# Patient Record
Sex: FEMALE | Race: WHITE | NOT HISPANIC OR LATINO | Employment: OTHER | URBAN - NONMETROPOLITAN AREA
[De-identification: names, ages, dates, MRNs, and addresses within clinical notes are randomized per-mention and may not be internally consistent; named-entity substitution may affect disease eponyms.]

---

## 2018-07-16 ENCOUNTER — OFFICE VISIT CONVERTED (OUTPATIENT)
Dept: FAMILY MEDICINE CLINIC | Age: 48
End: 2018-07-16
Attending: NURSE PRACTITIONER

## 2018-08-28 ENCOUNTER — OFFICE VISIT CONVERTED (OUTPATIENT)
Dept: FAMILY MEDICINE CLINIC | Age: 48
End: 2018-08-28
Attending: FAMILY MEDICINE

## 2019-08-06 ENCOUNTER — OFFICE VISIT CONVERTED (OUTPATIENT)
Dept: FAMILY MEDICINE CLINIC | Age: 49
End: 2019-08-06
Attending: NURSE PRACTITIONER

## 2020-07-30 ENCOUNTER — HOSPITAL ENCOUNTER (OUTPATIENT)
Dept: OTHER | Facility: HOSPITAL | Age: 50
Discharge: HOME | End: 2020-07-30
Attending: NURSE PRACTITIONER

## 2020-07-30 ENCOUNTER — OFFICE VISIT CONVERTED (OUTPATIENT)
Dept: FAMILY MEDICINE CLINIC | Age: 50
End: 2020-07-30
Attending: NURSE PRACTITIONER

## 2020-07-30 LAB
ALBUMIN SERPL-MCNC: 4.1 G/DL (ref 3.5–5)
ALBUMIN/GLOB SERPL: 1.4 {RATIO} (ref 1.4–2.6)
ALP SERPL-CCNC: 88 U/L (ref 42–98)
ALT SERPL-CCNC: 26 U/L (ref 10–40)
ANION GAP SERPL CALC-SCNC: 21 MMOL/L (ref 8–19)
AST SERPL-CCNC: 19 U/L (ref 15–50)
BASOPHILS # BLD AUTO: 0.02 10*3/UL (ref 0–0.2)
BASOPHILS NFR BLD AUTO: 0.3 % (ref 0–3)
BILIRUB SERPL-MCNC: 0.32 MG/DL (ref 0.2–1.3)
BUN SERPL-MCNC: 20 MG/DL (ref 5–25)
BUN/CREAT SERPL: 24 {RATIO} (ref 6–20)
CALCIUM SERPL-MCNC: 10.5 MG/DL (ref 8.7–10.4)
CHLORIDE SERPL-SCNC: 104 MMOL/L (ref 99–111)
CONV ABS IMM GRAN: 0.02 10*3/UL (ref 0–0.2)
CONV CO2: 22 MMOL/L (ref 22–32)
CONV IMMATURE GRAN: 0.3 % (ref 0–1.8)
CONV TOTAL PROTEIN: 7 G/DL (ref 6.3–8.2)
CREAT UR-MCNC: 0.82 MG/DL (ref 0.5–0.9)
DEPRECATED RDW RBC AUTO: 46.7 FL (ref 36.4–46.3)
EOSINOPHIL # BLD AUTO: 0.09 10*3/UL (ref 0–0.7)
EOSINOPHIL # BLD AUTO: 1.5 % (ref 0–7)
ERYTHROCYTE [DISTWIDTH] IN BLOOD BY AUTOMATED COUNT: 13.3 % (ref 11.7–14.4)
GFR SERPLBLD BASED ON 1.73 SQ M-ARVRAT: >60 ML/MIN/{1.73_M2}
GLOBULIN UR ELPH-MCNC: 2.9 G/DL (ref 2–3.5)
GLUCOSE SERPL-MCNC: 113 MG/DL (ref 65–99)
HCT VFR BLD AUTO: 46 % (ref 37–47)
HGB BLD-MCNC: 14.3 G/DL (ref 12–16)
LYMPHOCYTES # BLD AUTO: 1.11 10*3/UL (ref 1–5)
LYMPHOCYTES NFR BLD AUTO: 18.5 % (ref 20–45)
MCH RBC QN AUTO: 29.7 PG (ref 27–31)
MCHC RBC AUTO-ENTMCNC: 31.1 G/DL (ref 33–37)
MCV RBC AUTO: 95.4 FL (ref 81–99)
MONOCYTES # BLD AUTO: 0.55 10*3/UL (ref 0.2–1.2)
MONOCYTES NFR BLD AUTO: 9.2 % (ref 3–10)
NEUTROPHILS # BLD AUTO: 4.22 10*3/UL (ref 2–8)
NEUTROPHILS NFR BLD AUTO: 70.2 % (ref 30–85)
NRBC CBCN: 0 % (ref 0–0.7)
OSMOLALITY SERPL CALC.SUM OF ELEC: 297 MOSM/KG (ref 273–304)
PLATELET # BLD AUTO: 222 10*3/UL (ref 130–400)
PMV BLD AUTO: 11.1 FL (ref 9.4–12.3)
POTASSIUM SERPL-SCNC: 4.5 MMOL/L (ref 3.5–5.3)
RBC # BLD AUTO: 4.82 10*6/UL (ref 4.2–5.4)
SODIUM SERPL-SCNC: 142 MMOL/L (ref 135–147)
T4 FREE SERPL-MCNC: 1.8 NG/DL (ref 0.9–1.8)
TSH SERPL-ACNC: 0.01 M[IU]/L (ref 0.27–4.2)
WBC # BLD AUTO: 6.01 10*3/UL (ref 4.8–10.8)

## 2020-08-12 ENCOUNTER — OFFICE VISIT (OUTPATIENT)
Dept: ORTHOPEDIC SURGERY | Facility: CLINIC | Age: 50
End: 2020-08-12

## 2020-08-12 VITALS — TEMPERATURE: 98.6 F | WEIGHT: 260 LBS | HEIGHT: 65 IN | BODY MASS INDEX: 43.32 KG/M2

## 2020-08-12 DIAGNOSIS — M75.01 ADHESIVE CAPSULITIS OF RIGHT SHOULDER: Primary | ICD-10-CM

## 2020-08-12 PROCEDURE — 99203 OFFICE O/P NEW LOW 30 MIN: CPT | Performed by: NURSE PRACTITIONER

## 2020-08-12 PROCEDURE — 73030 X-RAY EXAM OF SHOULDER: CPT | Performed by: NURSE PRACTITIONER

## 2020-08-12 PROCEDURE — 20610 DRAIN/INJ JOINT/BURSA W/O US: CPT | Performed by: NURSE PRACTITIONER

## 2020-08-12 RX ORDER — POTASSIUM CHLORIDE 1.5 G/1.77G
20 POWDER, FOR SOLUTION ORAL 2 TIMES DAILY
COMMUNITY

## 2020-08-12 RX ORDER — BUPROPION HYDROCHLORIDE 150 MG/1
150 TABLET ORAL DAILY
COMMUNITY
End: 2020-11-19

## 2020-08-12 RX ORDER — ASPIRIN 81 MG/1
81 TABLET ORAL EVERY MORNING
COMMUNITY

## 2020-08-12 RX ORDER — SPIRONOLACTONE 25 MG/1
12.5 TABLET ORAL NIGHTLY
COMMUNITY

## 2020-08-12 RX ORDER — FUROSEMIDE 40 MG/1
80 TABLET ORAL EVERY EVENING
COMMUNITY
End: 2021-10-04

## 2020-08-12 RX ORDER — METHYLPREDNISOLONE ACETATE 80 MG/ML
80 INJECTION, SUSPENSION INTRA-ARTICULAR; INTRALESIONAL; INTRAMUSCULAR; SOFT TISSUE
Status: COMPLETED | OUTPATIENT
Start: 2020-08-12 | End: 2020-08-12

## 2020-08-12 RX ORDER — METOPROLOL TARTRATE 50 MG/1
50 TABLET, FILM COATED ORAL 2 TIMES DAILY
COMMUNITY
End: 2021-02-17

## 2020-08-12 RX ADMIN — METHYLPREDNISOLONE ACETATE 80 MG: 80 INJECTION, SUSPENSION INTRA-ARTICULAR; INTRALESIONAL; INTRAMUSCULAR; SOFT TISSUE at 14:43

## 2020-08-12 NOTE — PROGRESS NOTES
Patient: Ashely Moore    YOB: 1970    Medical Record Number: [id number]    Chief Complaints:    Right shoulder pain and motion loss    History of Present Illness:     49 y.o. female patient who presents for evaluation of the right shoulder.  She reports that the symptoms first started 3 weeks ago.  Denies injury or precipitating event.  Pain is described as severe, intermittent, aching, burning, and stabbing.  The pain is worse with any movements especially certain reaching and lifting movements.  Patient reports little to no pain at rest.  She has tried heat, Tylenol, and Biofreeze without relief.  She has noticed significant motion loss.  Patient is right hand dominant.     Allergies: No Known Allergies    Home Medications:    Current Outpatient Medications:   •  aspirin 325 MG tablet, Take 325 mg by mouth Daily., Disp: , Rfl:   •  buPROPion XL (WELLBUTRIN XL) 150 MG 24 hr tablet, Take 150 mg by mouth Daily., Disp: , Rfl:   •  furosemide (LASIX) 40 MG tablet, Take 40 mg by mouth 2 (Two) Times a Day., Disp: , Rfl:   •  metoprolol tartrate (LOPRESSOR) 50 MG tablet, Take 50 mg by mouth 2 (Two) Times a Day., Disp: , Rfl:   •  potassium chloride (KLOR-CON) 20 MEQ packet, Take 20 mEq by mouth 2 (Two) Times a Day., Disp: , Rfl:   •  sacubitril-valsartan (Entresto) 49-51 MG tablet, Take 1 tablet by mouth 2 (Two) Times a Day., Disp: , Rfl:   •  spironolactone (ALDACTONE) 25 MG tablet, Take 25 mg by mouth Daily., Disp: , Rfl:     Past Medical History:   Diagnosis Date   • Anxiety    • CHF (congestive heart failure) (CMS/HCC)    • Depression with anxiety    • Nonischemic dilated cardiomyopathy (CMS/HCC)    • Sleep apnea        Past Surgical History:   Procedure Laterality Date   • CARDIAC DEFIBRILLATOR PLACEMENT  12/09/2010    New battery placed 09/13/2018    • WISDOM TOOTH EXTRACTION         Social History     Occupational History   • Not on file   Tobacco Use   • Smoking status: Never Smoker   •  "Smokeless tobacco: Never Used   Substance and Sexual Activity   • Alcohol use: Not Currently   • Drug use: Not on file   • Sexual activity: Not on file      Social History     Social History Narrative   • Not on file       Family History   Problem Relation Age of Onset   • Heart disease Father    • Heart disease Brother    • Heart disease Paternal Grandmother    • Heart disease Brother      Review of Systems:      Constitutional: Denies fever, shaking or chills   Eyes: Denies change in visual acuity   HEENT: Denies nasal congestion or sore throat   Respiratory: Denies cough or shortness of breath   Cardiovascular: Denies chest pain or edema  Endocrine: Denies tremors, palpitations, intolerance of heat or cold, polyuria, polydipsia.  GI: Denies abdominal pain, nausea, vomiting, bloody stools or diarrhea  : Denies frequency, urgency, incontinence, retention, or nocturia.  Musculoskeletal: Denies numbness, tingling or loss of motor function except as above  Integument: Denies rash, lesion or ulceration   Neurologic: Denies headache or focal weakness, deficits  Heme: Denies spontaneous or excessive bleeding, epistaxis, hematuria, melena, fatigue, enlarged or tender lymph nodes.      All other pertinent positives and negatives as noted above in HPI.    Physical Exam:   49 y.o. female  Vitals:    08/12/20 1403   Temp: 98.6 °F (37 °C)   TempSrc: Oral   Weight: 118 kg (260 lb)   Height: 165.1 cm (65\")     General:  Patient is awake and alert.  Appears in no acute distress or discomfort.    Psych:  Affect and demeanor are appropriate.    Eyes:  Conjunctiva and sclera appear grossly normal.  Eyes track well and EOM seem to be intact.    Ears:  No gross abnormalities.  Hearing adequate for the exam.    Cardiovascular:  Regular rate and rhythm.    Lungs:  Good chest expansion.  Breathing unlabored.    Lymph:  No palpable adenopathy about neck or axilla.    Neck:  Supple.  Normal ROM.  Negative Spurling's for shoulder or arm " pain.    Right upper extremity:  Skin benign and intact without evidence for swelling, masses or atrophy.  No palpable masses.  Moderate tenderness noted over the rotator interval.  Limited active and passive range of motion:  forward elevation 145°, external rotation 45°, horizontal abduction 115°, internal rotation to back pocket.  Passively, I can range her approximately 5° more.  No evident instability or apprehension.  Discomfort but well preserved strength with resistive testing of rotator cuff.  Good strength in the wrist and hand including flexion, extension, , pinch, finger and thumb abduction.  Intact sensation throughout the arm and hand.  Palpable radial pulse with brisk cap refill.         Radiology:   Dr. Koenig and I reviewed the x-rays together.  AP, scapular Y, and axillary views of the right shoulder are ordered by myself and reviewed to evaluate the patient's complaint.  No comparison films are immediately available.  The x-rays show no obvious acute abnormalities, lesions, masses, significant degenerative changes, or other concerning findings.  The acromiohumeral interval is normal.  Unable to assess glenoid version.    Assessment/Plan:   Right shoulder adhesive capsulitis    We discussed the natural history of this condition in detail.  We thoroughly discussed treatment options including conservative versus surgical options. I have recommended that we start with a conservative approach. With regards to conservative options, we discussed appropriate activity modifications, icing as needed, acetaminophen, physical therapy, and injections.  We thoroughly discussed the critical role of physical therapy in the treatment of this condition.  I demonstrated some home exercises for the patient.  She has opted for an injection and physical therapy.  The risks and alternatives to an injection were thoroughly discussed.  Patient consented.  The injection was performed as noted below.  I have entered a  referral for physical therapy as well.  Going forward, patient will follow-up with me in 8 weeks.  If the symptoms persist despite physical therapy, we may need to consider a manipulation.    APRYL Aguilera    08/12/2020    CC to Sergio Ribeiro MD    Large Joint Arthrocentesis: R subacromial bursa  Date/Time: 8/12/2020 2:43 PM  Consent given by: patient  Site marked: site marked  Timeout: Immediately prior to procedure a time out was called to verify the correct patient, procedure, equipment, support staff and site/side marked as required   Supporting Documentation  Indications: pain   Procedure Details  Location: shoulder - R subacromial bursa  Preparation: Patient was prepped and draped in the usual sterile fashion  Needle gauge: 21 G.  Approach: posterior  Medications administered: 2 mL lidocaine (cardiac); 80 mg methylPREDNISolone acetate 80 MG/ML  Patient tolerance: patient tolerated the procedure well with no immediate complications

## 2020-08-31 ENCOUNTER — TELEPHONE (OUTPATIENT)
Dept: ORTHOPEDIC SURGERY | Facility: CLINIC | Age: 50
End: 2020-08-31

## 2020-08-31 DIAGNOSIS — M75.01 ADHESIVE CAPSULITIS OF RIGHT SHOULDER: Primary | ICD-10-CM

## 2020-08-31 RX ORDER — MELOXICAM 15 MG/1
15 TABLET ORAL DAILY
Qty: 30 TABLET | Refills: 1 | Status: SHIPPED | OUTPATIENT
Start: 2020-08-31 | End: 2020-11-19

## 2020-08-31 NOTE — TELEPHONE ENCOUNTER
I spoke to Ms. Oscar.  We discussed options for pain relief in detail.  I have given her a prescription for meloxicam to take once daily as needed.  The risks of this medication were discussed.  She reports she was on this medication briefly 2 years ago and tolerated it well.

## 2020-10-07 ENCOUNTER — OFFICE VISIT (OUTPATIENT)
Dept: ORTHOPEDIC SURGERY | Facility: CLINIC | Age: 50
End: 2020-10-07

## 2020-10-07 VITALS — WEIGHT: 240 LBS | HEIGHT: 65 IN | BODY MASS INDEX: 39.99 KG/M2

## 2020-10-07 DIAGNOSIS — M75.01 ADHESIVE CAPSULITIS OF RIGHT SHOULDER: Primary | ICD-10-CM

## 2020-10-07 PROCEDURE — 99212 OFFICE O/P EST SF 10 MIN: CPT | Performed by: NURSE PRACTITIONER

## 2020-10-07 NOTE — PROGRESS NOTES
"Chief Complaint: Follow-up right shoulder pain and motion loss    HPI: Patient returns today for right shoulder follow-up.  Reports the injection did not help.  She has persistent pain ranging from moderate to severe with activity and range of motion.  Reports she has not made any progress with physical therapy and home exercises.  In fact, she says, \"I feel like I am worse since I saw you\".  She is very concerned about her condition and lack of progress.        Vitals:    10/07/20 1259   Weight: 109 kg (240 lb)   Height: 165.1 cm (65\")       Exam: Her right shoulder is examined briefly.  She continues to have limited active and passive range of motion.  130° forward elevation, 45° external rotation, internal rotation to side pocket.  Passively I can get her to roughly 150° of forward elevation and roughly 5 degrees more with external and internal rotation.  She appears to have a positive Empty can and Bear Hugger, but her exam is very guarded.  It is difficult to assess her cuff strength.  Internal and external strength is 5/5 with elbows at her side.  Good motor and sensory function in the lower arm and hand.      Imaging: None taken    Assessment: Right shoulder adhesive capsulitis, possible rotator cuff tear    Plan:   We discussed a shoulder manipulation and all that entails.  We briefly discussed the natural progression of rotator cuff tears.  She is been unable to progress her motion with physical therapy.  She appears to have adhesive capsulitis, but I am concerned about the possibility of a rotator cuff tear as well.  I recommended further evaluation by CT arthrogram of the right shoulder with an eye toward surgery.  She has an implanted defibrillator that is not MRI compatible.  Patient agreed to the study.  I will call her with the results and come up with a plan at that time.           Alicia Zimmerman, APRYL  10/07/2020    "

## 2020-10-13 ENCOUNTER — TELEPHONE (OUTPATIENT)
Dept: ORTHOPEDICS | Facility: OTHER | Age: 50
End: 2020-10-13

## 2020-10-13 NOTE — TELEPHONE ENCOUNTER
PATIENT CALLING IN REGARDS TO IMAGING ORDERS FOR MRI AND CT 10/9/2020.  SHE WAS ASKED TO CALL BACK IF SHE HAS NOT YET HEARD FROM SCHEDULING.      PLEASE ADVISE PATIENT.  [phone]

## 2020-10-13 NOTE — TELEPHONE ENCOUNTER
Called patient and gave her the number to scheduling at [facility] so she can call them and get her imaging scheduled.

## 2020-10-27 ENCOUNTER — HOSPITAL ENCOUNTER (OUTPATIENT)
Dept: CT IMAGING | Facility: HOSPITAL | Age: 50
Discharge: HOME | End: 2020-10-27

## 2020-10-27 ENCOUNTER — HOSPITAL ENCOUNTER (OUTPATIENT)
Dept: GENERAL RADIOLOGY | Facility: HOSPITAL | Age: 50
Discharge: HOME | End: 2020-10-27

## 2020-10-27 DIAGNOSIS — M75.01 ADHESIVE CAPSULITIS OF RIGHT SHOULDER: ICD-10-CM

## 2020-10-27 PROCEDURE — 77002 NEEDLE LOCALIZATION BY XRAY: CPT

## 2020-10-27 PROCEDURE — 25010000003 LIDOCAINE 1 % SOLUTION: Performed by: NURSE PRACTITIONER

## 2020-10-27 PROCEDURE — 73201 CT UPPER EXTREMITY W/DYE: CPT

## 2020-10-27 PROCEDURE — 25010000002 IOPAMIDOL 61 % SOLUTION: Performed by: NURSE PRACTITIONER

## 2020-10-27 RX ORDER — LIDOCAINE HYDROCHLORIDE 10 MG/ML
10 INJECTION, SOLUTION INFILTRATION; PERINEURAL ONCE
Status: COMPLETED | OUTPATIENT
Start: 2020-10-27 | End: 2020-10-27

## 2020-10-27 RX ADMIN — LIDOCAINE HYDROCHLORIDE 1 ML: 10 INJECTION, SOLUTION INFILTRATION; PERINEURAL at 10:49

## 2020-10-27 RX ADMIN — IOPAMIDOL 12 ML: 612 INJECTION, SOLUTION INTRAVENOUS at 10:49

## 2020-10-30 ENCOUNTER — TELEPHONE (OUTPATIENT)
Dept: ORTHOPEDIC SURGERY | Facility: CLINIC | Age: 50
End: 2020-10-30

## 2020-10-30 NOTE — TELEPHONE ENCOUNTER
I spoke to Ms. Moore.  I provided her with the right shoulder CT results.  She has moderate AC arthritis.  No rotator cuff tear or other internal derangement.  I explained that I feel her problem is still adhesive capsulitis.  I recommended she follow-up with Dr. Koenig for further evaluation and treatment.  She agreed.  I will have our office contact her to schedule this appointment.

## 2020-11-11 ENCOUNTER — TELEPHONE (OUTPATIENT)
Dept: ORTHOPEDICS | Facility: OTHER | Age: 50
End: 2020-11-11

## 2020-11-11 ENCOUNTER — OFFICE VISIT (OUTPATIENT)
Dept: ORTHOPEDIC SURGERY | Facility: CLINIC | Age: 50
End: 2020-11-11

## 2020-11-11 VITALS — WEIGHT: 260 LBS | TEMPERATURE: 96.2 F | HEIGHT: 65 IN | BODY MASS INDEX: 43.32 KG/M2

## 2020-11-11 DIAGNOSIS — M75.01 ADHESIVE CAPSULITIS OF RIGHT SHOULDER: Primary | ICD-10-CM

## 2020-11-11 PROCEDURE — 99214 OFFICE O/P EST MOD 30 MIN: CPT | Performed by: ORTHOPAEDIC SURGERY

## 2020-11-11 RX ORDER — PROMETHAZINE HYDROCHLORIDE 12.5 MG/1
12.5 TABLET ORAL EVERY 6 HOURS PRN
Qty: 30 TABLET | Refills: 0 | Status: SHIPPED | OUTPATIENT
Start: 2020-11-11 | End: 2020-11-19

## 2020-11-11 RX ORDER — TRAMADOL HYDROCHLORIDE 50 MG/1
50 TABLET ORAL EVERY 4 HOURS PRN
Qty: 60 TABLET | Refills: 0 | Status: SHIPPED | OUTPATIENT
Start: 2020-11-11 | End: 2020-11-19

## 2020-11-11 NOTE — H&P (VIEW-ONLY)
Patient: Ashely Moore    YOB: 1970    Medical Record Number: [id number]    Chief Complaints: Right shoulder pain and motion loss    History of Present Illness:     49 y.o. female patient who presents for her right shoulder.  She has previously seen Alicia for this issue.  She reports a roughly 4-month history of right shoulder pain and motion loss.  She had an injection which did not help.  She has been going to therapy but she feels like she has plateaued.  Pain is moderate, intermittent and stabbing.  The pain is associated with certain reaching and lifting movements.    Allergies: No Known Allergies    Home Medications:    Current Outpatient Medications:   •  aspirin (aspirin) 81 MG EC tablet, Take 81 mg by mouth Daily., Disp: , Rfl:   •  buPROPion XL (WELLBUTRIN XL) 150 MG 24 hr tablet, Take 150 mg by mouth Daily., Disp: , Rfl:   •  furosemide (LASIX) 40 MG tablet, Take 40 mg by mouth Daily., Disp: , Rfl:   •  metoprolol tartrate (LOPRESSOR) 50 MG tablet, Take 50 mg by mouth 2 (Two) Times a Day., Disp: , Rfl:   •  potassium chloride (KLOR-CON) 20 MEQ packet, Take 20 mEq by mouth Daily., Disp: , Rfl:   •  sacubitril-valsartan (Entresto) 49-51 MG tablet, Take 1 tablet by mouth 2 (Two) Times a Day., Disp: , Rfl:   •  spironolactone (ALDACTONE) 25 MG tablet, Take 25 mg by mouth Daily. 1/2 (12.5 mg) tablet daily, Disp: , Rfl:   •  meloxicam (Mobic) 15 MG tablet, Take 1 tablet by mouth Daily., Disp: 30 tablet, Rfl: 1  •  sertraline (ZOLOFT) 50 MG tablet, , Disp: , Rfl:     Past Medical History:   Diagnosis Date   • Anxiety    • CHF (congestive heart failure) (CMS/HCC)    • Depression with anxiety    • Nonischemic dilated cardiomyopathy (CMS/HCC)    • Sleep apnea        Past Surgical History:   Procedure Laterality Date   • CARDIAC DEFIBRILLATOR PLACEMENT  12/09/2010    New battery placed 09/13/2018    • WISDOM TOOTH EXTRACTION         Social History     Occupational History   • Not on file   Tobacco  "Use   • Smoking status: Never Smoker   • Smokeless tobacco: Never Used   Substance and Sexual Activity   • Alcohol use: Not Currently   • Drug use: Not on file   • Sexual activity: Not on file      Social History     Social History Narrative   • Not on file       Family History   Problem Relation Age of Onset   • Heart disease Father    • Heart disease Brother    • Heart disease Paternal Grandmother    • Heart disease Brother        Review of Systems:      Constitutional: Denies fever, shaking or chills   Eyes: Denies change in visual acuity   HEENT: Denies nasal congestion or sore throat   Respiratory: Denies cough or shortness of breath   Cardiovascular: Denies chest pain or edema  Endocrine: Denies tremors, palpitations, intolerance of heat or cold, polyuria, polydipsia.  GI: Denies abdominal pain, nausea, vomiting, bloody stools or diarrhea  : Denies frequency, urgency, incontinence, retention, or nocturia.  Musculoskeletal: Denies numbness, tingling or loss of motor function except as above  Integument: Denies rash, lesion or ulceration   Neurologic: Denies headache or focal weakness, deficits  Heme: Denies spontaneous or excessive bleeding, epistaxis, hematuria, melena, fatigue, enlarged or tender lymph nodes.      All other pertinent positives and negatives as noted above in HPI.    Physical Exam: 49 y.o. female  Vitals:    11/11/20 1052   Temp: 96.2 °F (35.7 °C)   TempSrc: Temporal   Weight: 118 kg (260 lb)   Height: 165.1 cm (65\")     General:  Patient is awake and alert.  Appears in no acute distress or discomfort.    Psych:  Affect and demeanor are appropriate.    Eyes:  Conjunctiva and sclera appear grossly normal.  Eyes track well and EOM seem to be intact.    Ears:  No gross abnormalities.  Hearing adequate for the exam.    Cardiovascular:  Regular rate and rhythm.    Lungs:  Good chest expansion.  Breathing unlabored.    Extremities: Right shoulder is examined.  Skin is benign.  No focal areas of " tenderness.  Active and passive motion are extremely limited.  Forward elevation is about 125 to 130 degrees.  External rotation roughly 35 degrees.  Internal rotation to between her side and back pocket.  She lacks at least 15 degrees of horizontal abduction.  Deltoid fires on exam.  Intact axillary nerve sensation.  Good strength with resisted abduction, elevation, internal and external rotation albeit with discomfort.  Normal motor and sensory function in her lower arm and hand.  Palpable radial pulse.  Brisk capillary refill.    Imaging: Her previous AP, scapular Y and axillary views of the right shoulder from August are reviewed.  No abnormalities noted.  Recent CT arthrogram of the right shoulder is reviewed along with the associated report.  Incidental note of moderate acromioclavicular arthritis.  Rotator cuff, cartilage and other structures look fine.    Assessment/Plan: Right shoulder adhesive capsulitis    We discussed the natural history of this condition and her options.  Given her failure of conservative treatment thus far, I have recommended a manipulation under anesthesia with an intra-articular injection.  I explained all risks including fracture, tendon tear, bleeding, infection, hematoma, chronic pain, worsening of pain, chondrolysis, swelling, persistent loss of motion, weakness, stiffness, instability, DVT, pulmonary embolus, death, stroke, complex regional pain syndrome, and need for additional procedures.  Patient verbalized understanding, and was given the opportunity to ask and have all questions answered today.  No guarantees were given regarding results of the procedure.  I do recommend she get medical clearance from her cardiologist, Dr. Bullock.  She tells me she has difficulty sleeping due to the pain and has requested something to take at night.  I offered her tramadol.  She tells me that medicine works for her but can cause nausea occasionally.  I have agreed to give her a limited  prescription for Phenergan as well.  Risks were discussed.    Gildardo Koenig MD    11/11/2020

## 2020-11-11 NOTE — TELEPHONE ENCOUNTER
PT WAS SEEN BY  ON 11/11 AND THEY HAD DISCUSSED SURGERY BEING SCHED IN DECEMBER DUE TO THE LACK OF PT HOURS, PT CALLED STATING THAT IT TURNS OUT SHE DOES ALREADY HAVE ENOUGH PT HOURS TO HAVE THE SURGERY, PT WANTS TO KNOW IF THERE WAS ANYWAY THE SURGERY CAN BE SCHED BEFORE December. PLEASE ADVISE       BEST CALL BACK [phone]

## 2020-11-11 NOTE — PROGRESS NOTES
Patient: Ashely Moore    YOB: 1970    Medical Record Number: [id number]    Chief Complaints: Right shoulder pain and motion loss    History of Present Illness:     49 y.o. female patient who presents for her right shoulder.  She has previously seen Alicia for this issue.  She reports a roughly 4-month history of right shoulder pain and motion loss.  She had an injection which did not help.  She has been going to therapy but she feels like she has plateaued.  Pain is moderate, intermittent and stabbing.  The pain is associated with certain reaching and lifting movements.    Allergies: No Known Allergies    Home Medications:    Current Outpatient Medications:   •  aspirin (aspirin) 81 MG EC tablet, Take 81 mg by mouth Daily., Disp: , Rfl:   •  buPROPion XL (WELLBUTRIN XL) 150 MG 24 hr tablet, Take 150 mg by mouth Daily., Disp: , Rfl:   •  furosemide (LASIX) 40 MG tablet, Take 40 mg by mouth Daily., Disp: , Rfl:   •  metoprolol tartrate (LOPRESSOR) 50 MG tablet, Take 50 mg by mouth 2 (Two) Times a Day., Disp: , Rfl:   •  potassium chloride (KLOR-CON) 20 MEQ packet, Take 20 mEq by mouth Daily., Disp: , Rfl:   •  sacubitril-valsartan (Entresto) 49-51 MG tablet, Take 1 tablet by mouth 2 (Two) Times a Day., Disp: , Rfl:   •  spironolactone (ALDACTONE) 25 MG tablet, Take 25 mg by mouth Daily. 1/2 (12.5 mg) tablet daily, Disp: , Rfl:   •  meloxicam (Mobic) 15 MG tablet, Take 1 tablet by mouth Daily., Disp: 30 tablet, Rfl: 1  •  sertraline (ZOLOFT) 50 MG tablet, , Disp: , Rfl:     Past Medical History:   Diagnosis Date   • Anxiety    • CHF (congestive heart failure) (CMS/HCC)    • Depression with anxiety    • Nonischemic dilated cardiomyopathy (CMS/HCC)    • Sleep apnea        Past Surgical History:   Procedure Laterality Date   • CARDIAC DEFIBRILLATOR PLACEMENT  12/09/2010    New battery placed 09/13/2018    • WISDOM TOOTH EXTRACTION         Social History     Occupational History   • Not on file   Tobacco  "Use   • Smoking status: Never Smoker   • Smokeless tobacco: Never Used   Substance and Sexual Activity   • Alcohol use: Not Currently   • Drug use: Not on file   • Sexual activity: Not on file      Social History     Social History Narrative   • Not on file       Family History   Problem Relation Age of Onset   • Heart disease Father    • Heart disease Brother    • Heart disease Paternal Grandmother    • Heart disease Brother        Review of Systems:      Constitutional: Denies fever, shaking or chills   Eyes: Denies change in visual acuity   HEENT: Denies nasal congestion or sore throat   Respiratory: Denies cough or shortness of breath   Cardiovascular: Denies chest pain or edema  Endocrine: Denies tremors, palpitations, intolerance of heat or cold, polyuria, polydipsia.  GI: Denies abdominal pain, nausea, vomiting, bloody stools or diarrhea  : Denies frequency, urgency, incontinence, retention, or nocturia.  Musculoskeletal: Denies numbness, tingling or loss of motor function except as above  Integument: Denies rash, lesion or ulceration   Neurologic: Denies headache or focal weakness, deficits  Heme: Denies spontaneous or excessive bleeding, epistaxis, hematuria, melena, fatigue, enlarged or tender lymph nodes.      All other pertinent positives and negatives as noted above in HPI.    Physical Exam: 49 y.o. female  Vitals:    11/11/20 1052   Temp: 96.2 °F (35.7 °C)   TempSrc: Temporal   Weight: 118 kg (260 lb)   Height: 165.1 cm (65\")     General:  Patient is awake and alert.  Appears in no acute distress or discomfort.    Psych:  Affect and demeanor are appropriate.    Eyes:  Conjunctiva and sclera appear grossly normal.  Eyes track well and EOM seem to be intact.    Ears:  No gross abnormalities.  Hearing adequate for the exam.    Cardiovascular:  Regular rate and rhythm.    Lungs:  Good chest expansion.  Breathing unlabored.    Extremities: Right shoulder is examined.  Skin is benign.  No focal areas of " tenderness.  Active and passive motion are extremely limited.  Forward elevation is about 125 to 130 degrees.  External rotation roughly 35 degrees.  Internal rotation to between her side and back pocket.  She lacks at least 15 degrees of horizontal abduction.  Deltoid fires on exam.  Intact axillary nerve sensation.  Good strength with resisted abduction, elevation, internal and external rotation albeit with discomfort.  Normal motor and sensory function in her lower arm and hand.  Palpable radial pulse.  Brisk capillary refill.    Imaging: Her previous AP, scapular Y and axillary views of the right shoulder from August are reviewed.  No abnormalities noted.  Recent CT arthrogram of the right shoulder is reviewed along with the associated report.  Incidental note of moderate acromioclavicular arthritis.  Rotator cuff, cartilage and other structures look fine.    Assessment/Plan: Right shoulder adhesive capsulitis    We discussed the natural history of this condition and her options.  Given her failure of conservative treatment thus far, I have recommended a manipulation under anesthesia with an intra-articular injection.  I explained all risks including fracture, tendon tear, bleeding, infection, hematoma, chronic pain, worsening of pain, chondrolysis, swelling, persistent loss of motion, weakness, stiffness, instability, DVT, pulmonary embolus, death, stroke, complex regional pain syndrome, and need for additional procedures.  Patient verbalized understanding, and was given the opportunity to ask and have all questions answered today.  No guarantees were given regarding results of the procedure.  I do recommend she get medical clearance from her cardiologist, Dr. Bullock.  She tells me she has difficulty sleeping due to the pain and has requested something to take at night.  I offered her tramadol.  She tells me that medicine works for her but can cause nausea occasionally.  I have agreed to give her a limited  prescription for Phenergan as well.  Risks were discussed.    Gildardo Koenig MD    11/11/2020

## 2020-11-13 NOTE — TELEPHONE ENCOUNTER
She should be able to do 5 straight days after the 19th, correct?  Thanksgiving is not until the following week.

## 2020-11-16 ENCOUNTER — TELEPHONE (OUTPATIENT)
Dept: ORTHOPEDICS | Facility: OTHER | Age: 50
End: 2020-11-16

## 2020-11-16 PROBLEM — M75.01 ADHESIVE CAPSULITIS OF RIGHT SHOULDER: Status: ACTIVE | Noted: 2020-11-16

## 2020-11-16 NOTE — TELEPHONE ENCOUNTER
CALLED INSURANCE AND SPOKE WITH STACIE AT [facility].  THE TRAMADOL RX IS GOING UP FOR REVIEW THIS COULD TAKE UP TO 5 DAYS.  THE PA # [id number]  THEY WILL BE NEEDING OFFICE NOTES AND THESE WERE FAXED [phone]    I CALLED PATIENT AND EXPLAINED EVERYTHING TO HER. THAT NOW WE ARE WAITING ON THE INSURANCE COMPANY TO REVIEW.

## 2020-11-16 NOTE — TELEPHONE ENCOUNTER
PT CALLED STATING THAT  PRESCRIBED TRAMELDOL AND WHEN SHE PICKED THE PRESCRIPTION UP SHE HAD TO PAY FOR THE MEDICINE OUT OF POCKET DUE TO INSURANCE NOT COVERING IT, PT STATED THAT SHE CALLED THE INSURANCE COMPANY AND THEY SAID THE MEDICINE HAD TO BE PRE AUTHORIZED BY THE DOCTOR IN ORDER FOR THEM TO COVER THE COSTS. INSURANCE GAVE THE PT NUMBER ([id number]) FOR  TO PRE AUTHORIZE PRESCRIPTION TO REIMBURSE THE PT.  DOCTOR WOULD HAVE TO BACK DATE TO 11/12/20 WHEN THE PT PICKED THE PRESCRIPTION UP. PLEASE  ADVISE.      BEST CALL NUMBER FOR PT: [phone]

## 2020-11-17 ENCOUNTER — TRANSCRIBE ORDERS (OUTPATIENT)
Dept: PREADMISSION TESTING | Facility: HOSPITAL | Age: 50
End: 2020-11-17

## 2020-11-17 DIAGNOSIS — Z01.818 OTHER SPECIFIED PRE-OPERATIVE EXAMINATION: Primary | ICD-10-CM

## 2020-11-19 ENCOUNTER — APPOINTMENT (OUTPATIENT)
Dept: PREADMISSION TESTING | Facility: HOSPITAL | Age: 50
End: 2020-11-19

## 2020-11-19 VITALS
DIASTOLIC BLOOD PRESSURE: 64 MMHG | SYSTOLIC BLOOD PRESSURE: 108 MMHG | HEART RATE: 97 BPM | HEIGHT: 65 IN | TEMPERATURE: 97.6 F | OXYGEN SATURATION: 96 % | RESPIRATION RATE: 20 BRPM | BODY MASS INDEX: 45.82 KG/M2 | WEIGHT: 275 LBS

## 2020-11-19 LAB
ANION GAP SERPL CALCULATED.3IONS-SCNC: 12.2 MMOL/L (ref 5–15)
BUN SERPL-MCNC: 23 MG/DL (ref 6–20)
BUN/CREAT SERPL: 23.5 (ref 7–25)
CALCIUM SPEC-SCNC: 9.1 MG/DL (ref 8.6–10.5)
CHLORIDE SERPL-SCNC: 104 MMOL/L (ref 98–107)
CO2 SERPL-SCNC: 25.8 MMOL/L (ref 22–29)
CREAT SERPL-MCNC: 0.98 MG/DL (ref 0.57–1)
DEPRECATED RDW RBC AUTO: 56 FL (ref 37–54)
ERYTHROCYTE [DISTWIDTH] IN BLOOD BY AUTOMATED COUNT: 17 % (ref 12.3–15.4)
GFR SERPL CREATININE-BSD FRML MDRD: 60 ML/MIN/1.73
GLUCOSE SERPL-MCNC: 98 MG/DL (ref 65–99)
HCT VFR BLD AUTO: 42 % (ref 34–46.6)
HGB BLD-MCNC: 14.1 G/DL (ref 12–15.9)
MCH RBC QN AUTO: 31 PG (ref 26.6–33)
MCHC RBC AUTO-ENTMCNC: 33.6 G/DL (ref 31.5–35.7)
MCV RBC AUTO: 92.3 FL (ref 79–97)
PLATELET # BLD AUTO: 199 10*3/MM3 (ref 140–450)
PMV BLD AUTO: 10.1 FL (ref 6–12)
POTASSIUM SERPL-SCNC: 3.8 MMOL/L (ref 3.5–5.2)
QT INTERVAL: 423 MS
RBC # BLD AUTO: 4.55 10*6/MM3 (ref 3.77–5.28)
SODIUM SERPL-SCNC: 142 MMOL/L (ref 136–145)
WBC # BLD AUTO: 6.08 10*3/MM3 (ref 3.4–10.8)

## 2020-11-19 PROCEDURE — 85027 COMPLETE CBC AUTOMATED: CPT

## 2020-11-19 PROCEDURE — 93010 ELECTROCARDIOGRAM REPORT: CPT | Performed by: INTERNAL MEDICINE

## 2020-11-19 PROCEDURE — 93005 ELECTROCARDIOGRAM TRACING: CPT

## 2020-11-19 PROCEDURE — 80048 BASIC METABOLIC PNL TOTAL CA: CPT

## 2020-11-19 PROCEDURE — 36415 COLL VENOUS BLD VENIPUNCTURE: CPT

## 2020-11-19 RX ORDER — ACETAMINOPHEN 500 MG
500 TABLET ORAL EVERY 6 HOURS PRN
COMMUNITY
End: 2020-11-24 | Stop reason: HOSPADM

## 2020-11-19 NOTE — DISCHARGE INSTRUCTIONS
Arrive to hospital on your day of surgery at 545 AM TO THE OUTPATIENT [facility]    Take the following medications the morning of surgery:  METOPROLOL AND TYLENOL IF NEEDED      If you are on prescription narcotic pain medication to control your pain you may also take that medication the morning of surgery.    General Instructions:  • Do not eat solid food after midnight the night before surgery.  • You may drink clear liquids day of surgery but must stop at least one hour before your hospital arrival time.  • It is beneficial for you to have a clear drink that contains carbohydrates the day of surgery.  We suggest a 12 to 20 ounce bottle of Gatorade or Powerade for non-diabetic patients or a 12 to 20 ounce bottle of G2 or Powerade Zero for diabetic patients. (Pediatric patients, are not advised to drink a 12 to 20 ounce carbohydrate drink)    Clear liquids are liquids you can see through.  Nothing red in color.     Plain water                               Sports drinks  Sodas                                   Gelatin (Jell-O)  Fruit juices without pulp such as white grape juice and apple juice  Popsicles that contain no fruit or yogurt  Tea or coffee (no cream or milk added)  Gatorade / Powerade  G2 / Powerade Zero    • Infants may have breast milk up to four hours before surgery.  • Infants drinking formula may drink formula up to six hours before surgery.   • Patients who avoid smoking, chewing tobacco and alcohol for 4 weeks prior to surgery have a reduced risk of post-operative complications.  Quit smoking as many days before surgery as you can.  • Do not smoke, use chewing tobacco or drink alcohol the day of surgery.   • If applicable bring your C-PAP/ BI-PAP machine.  • Bring any papers given to you in the doctor’s office.  • Wear clean comfortable clothes.  • Do not wear contact lenses, false eyelashes or make-up.  Bring a case for your glasses.   • Bring crutches or walker if applicable.  • Remove all  piercings.  Leave jewelry and any other valuables at home.  • Hair extensions with metal clips must be removed prior to surgery.  • The Pre-Admission Testing nurse will instruct you to bring medications if unable to obtain an accurate list in Pre-Admission Testing.        If you were given a blood bank ID arm band remember to bring it with you the day of surgery.    Preventing a Surgical Site Infection:  • For 2 to 3 days before surgery, avoid shaving with a razor because the razor can irritate skin and make it easier to develop an infection.    • Any areas of open skin can increase the risk of a post-operative wound infection by allowing bacteria to enter and travel throughout the body.  Notify your surgeon if you have any skin wounds / rashes even if it is not near the expected surgical site.  The area will need assessed to determine if surgery should be delayed until it is healed.  • The night prior to surgery shower using a fresh bar of anti-bacterial soap (such as Dial) and clean washcloth.  Sleep in a clean bed with clean clothing.  Do not allow pets to sleep with you.  • Shower on the morning of surgery using a fresh bar of anti-bacterial soap (such as Dial) and clean washcloth.  Dry with a clean towel and dress in clean clothing.  • Ask your surgeon if you will be receiving antibiotics prior to surgery.  • Make sure you, your family, and all healthcare providers clean their hands with soap and water or an alcohol based hand  before caring for you or your wound.    Day of surgery:  Your arrival time is approximately two hours before your scheduled surgery time.  Upon arrival, a Pre-op nurse and Anesthesiologist will review your health history, obtain vital signs, and answer questions you may have.  The only belongings needed at this time will be a list of your home medications and if applicable your C-PAP/BI-PAP machine.  If you are staying overnight your family can leave the rest of your belongings  in the car and bring them to your room later.  A Pre-op nurse will start an IV and you may receive medication in preparation for surgery, including something to help you relax.  While you are in surgery your family should notify the waiting room  if they leave the waiting room area and provide a contact phone number.    Please be aware that surgery does come with discomfort.  We want to make every effort to control your discomfort so please discuss any uncontrolled symptoms with your nurse.   Your doctor will most likely have prescribed pain medications.      If you are going home after surgery you will receive individualized written care instructions before being discharged.  A responsible adult must drive you to and from the hospital on the day of your surgery and stay with you for 24 hours.    If you are staying overnight following surgery, you will be transported to your hospital room following the recovery period.  [facility] has all private rooms.    If you have any questions please call Pre-Admission Testing at [phone].  Deductibles and co-payments are collected on the day of service. Please be prepared to pay the required co-pay, deductible or deposit on the day of service as defined by your plan.    Patient Education for Self-Quarantine Process    Following your COVID testing, we strongly recommend that you do not leave your home after you have been tested for COVID except to get medical care. This includes not going to work, school or to public areas.  If this is not possible for you to do please limit your activities to only required outings.  Be sure to wear a mask when you are with other people, practice social distancing and wash your hands frequently.      The following items provide additional details to keep you safe.  • Wash your hands with soap and water frequently for at least 20 seconds.   • Avoid touching your eyes, nose and mouth with unwashed hands.  • Do not  share anything - utensils, towels, food from the same bowl.   • Have your own utensils, drinking glass, dishes, towels and bedding.   • Do not have visitors.   • Do use FaceTime to stay in touch with family and friends.  • You should stay in a specific room away from others if possible.   • Stay at least 6 feet away from others in the home if you cannot have a dedicated room to yourself.   • Do not snuggle with your pet. While the CDC says there is no evidence that pets can spread COVID-19 or be infected from humans, it is probably best to avoid “petting, snuggling, being kissed or licked and sharing food (during self-quarantine)”, according to the CDC.   • Sanitize household surfaces daily. Include all high touch areas (door handles, light switches, phones, countertops, etc.)  • Do not share a bathroom with others, if possible.   • Wear a mask around others in your home if you are unable to stay in a separate room or 6 feet apart. If  you are unable to wear a mask, have your family member wear a mask if they must be within 6 feet of you.   Call your surgeon immediately if you experience any of the following symptoms:  • Sore Throat  • Shortness of Breath or difficulty breathing  • Cough  • Chills  • Body soreness or muscle pain  • Headache  • Fever  • New loss of taste or smell  • Do not arrive for your surgery ill.  Your procedure will need to be rescheduled to another time.  You will need to call your physician before the day of surgery to avoid any unnecessary exposure to hospital staff as well as other patients.

## 2020-11-21 ENCOUNTER — LAB (OUTPATIENT)
Dept: LAB | Facility: HOSPITAL | Age: 50
End: 2020-11-21

## 2020-11-21 DIAGNOSIS — Z01.818 OTHER SPECIFIED PRE-OPERATIVE EXAMINATION: ICD-10-CM

## 2020-11-21 PROCEDURE — C9803 HOPD COVID-19 SPEC COLLECT: HCPCS

## 2020-11-21 PROCEDURE — U0004 COV-19 TEST NON-CDC HGH THRU: HCPCS

## 2020-11-23 LAB — SARS-COV-2 RNA RESP QL NAA+PROBE: NOT DETECTED

## 2020-11-24 ENCOUNTER — HOSPITAL ENCOUNTER (OUTPATIENT)
Facility: HOSPITAL | Age: 50
Setting detail: HOSPITAL OUTPATIENT SURGERY
Discharge: HOME | End: 2020-11-24
Attending: ORTHOPAEDIC SURGERY | Admitting: ORTHOPAEDIC SURGERY

## 2020-11-24 ENCOUNTER — ANESTHESIA (OUTPATIENT)
Dept: PERIOP | Facility: HOSPITAL | Age: 50
End: 2020-11-24

## 2020-11-24 ENCOUNTER — ANESTHESIA EVENT (OUTPATIENT)
Dept: PERIOP | Facility: HOSPITAL | Age: 50
End: 2020-11-24

## 2020-11-24 VITALS
SYSTOLIC BLOOD PRESSURE: 111 MMHG | OXYGEN SATURATION: 92 % | DIASTOLIC BLOOD PRESSURE: 74 MMHG | HEART RATE: 83 BPM | TEMPERATURE: 97.8 F | RESPIRATION RATE: 20 BRPM

## 2020-11-24 VITALS — OXYGEN SATURATION: 93 % | HEART RATE: 91 BPM | SYSTOLIC BLOOD PRESSURE: 133 MMHG | DIASTOLIC BLOOD PRESSURE: 94 MMHG

## 2020-11-24 DIAGNOSIS — M75.01 ADHESIVE CAPSULITIS OF RIGHT SHOULDER: Primary | ICD-10-CM

## 2020-11-24 PROCEDURE — 25010000002 ROPIVACAINE PER 1 MG: Performed by: ANESTHESIOLOGY

## 2020-11-24 PROCEDURE — 25010000002 MIDAZOLAM PER 1 MG: Performed by: ANESTHESIOLOGY

## 2020-11-24 PROCEDURE — 25010000002 METHYLPREDNISOLONE PER 80 MG: Performed by: ORTHOPAEDIC SURGERY

## 2020-11-24 PROCEDURE — 25010000002 FENTANYL CITRATE (PF) 100 MCG/2ML SOLUTION: Performed by: ANESTHESIOLOGY

## 2020-11-24 PROCEDURE — 25010000002 DEXAMETHASONE PER 1 MG: Performed by: ANESTHESIOLOGY

## 2020-11-24 PROCEDURE — 23700 MNPJ ANES SHO JT FIXJ APRATS: CPT | Performed by: ORTHOPAEDIC SURGERY

## 2020-11-24 PROCEDURE — 25010000002 PROPOFOL 10 MG/ML EMULSION: Performed by: ANESTHESIOLOGY

## 2020-11-24 PROCEDURE — 94799 UNLISTED PULMONARY SVC/PX: CPT

## 2020-11-24 PROCEDURE — 25010000003 LIDOCAINE 1 % SOLUTION: Performed by: ORTHOPAEDIC SURGERY

## 2020-11-24 RX ORDER — FLUMAZENIL 0.1 MG/ML
0.2 INJECTION INTRAVENOUS AS NEEDED
Status: DISCONTINUED | OUTPATIENT
Start: 2020-11-24 | End: 2020-11-24 | Stop reason: HOSPADM

## 2020-11-24 RX ORDER — MIDAZOLAM HYDROCHLORIDE 1 MG/ML
1 INJECTION INTRAMUSCULAR; INTRAVENOUS ONCE
Status: COMPLETED | OUTPATIENT
Start: 2020-11-24 | End: 2020-11-24

## 2020-11-24 RX ORDER — FENTANYL CITRATE 50 UG/ML
100 INJECTION, SOLUTION INTRAMUSCULAR; INTRAVENOUS
Status: DISCONTINUED | OUTPATIENT
Start: 2020-11-24 | End: 2020-11-24 | Stop reason: HOSPADM

## 2020-11-24 RX ORDER — LIDOCAINE HYDROCHLORIDE 10 MG/ML
0.5 INJECTION, SOLUTION EPIDURAL; INFILTRATION; INTRACAUDAL; PERINEURAL ONCE AS NEEDED
Status: DISCONTINUED | OUTPATIENT
Start: 2020-11-24 | End: 2020-11-24 | Stop reason: HOSPADM

## 2020-11-24 RX ORDER — DOCUSATE SODIUM 100 MG/1
100 CAPSULE, LIQUID FILLED ORAL 2 TIMES DAILY
Qty: 60 CAPSULE | Refills: 0 | Status: SHIPPED | OUTPATIENT
Start: 2020-11-24 | End: 2021-02-17

## 2020-11-24 RX ORDER — DEXAMETHASONE SODIUM PHOSPHATE 4 MG/ML
INJECTION, SOLUTION INTRA-ARTICULAR; INTRALESIONAL; INTRAMUSCULAR; INTRAVENOUS; SOFT TISSUE
Status: COMPLETED | OUTPATIENT
Start: 2020-11-24 | End: 2020-11-24

## 2020-11-24 RX ORDER — MIDAZOLAM HYDROCHLORIDE 1 MG/ML
2 INJECTION INTRAMUSCULAR; INTRAVENOUS
Status: DISCONTINUED | OUTPATIENT
Start: 2020-11-24 | End: 2020-11-24 | Stop reason: HOSPADM

## 2020-11-24 RX ORDER — EPHEDRINE SULFATE 50 MG/ML
5 INJECTION, SOLUTION INTRAVENOUS ONCE AS NEEDED
Status: DISCONTINUED | OUTPATIENT
Start: 2020-11-24 | End: 2020-11-24 | Stop reason: HOSPADM

## 2020-11-24 RX ORDER — HYDROMORPHONE HYDROCHLORIDE 1 MG/ML
0.5 INJECTION, SOLUTION INTRAMUSCULAR; INTRAVENOUS; SUBCUTANEOUS
Status: DISCONTINUED | OUTPATIENT
Start: 2020-11-24 | End: 2020-11-24 | Stop reason: HOSPADM

## 2020-11-24 RX ORDER — HYDROCODONE BITARTRATE AND ACETAMINOPHEN 7.5; 325 MG/1; MG/1
1 TABLET ORAL ONCE AS NEEDED
Status: COMPLETED | OUTPATIENT
Start: 2020-11-24 | End: 2020-11-24

## 2020-11-24 RX ORDER — METHYLPREDNISOLONE ACETATE 80 MG/ML
INJECTION, SUSPENSION INTRA-ARTICULAR; INTRALESIONAL; INTRAMUSCULAR; SOFT TISSUE AS NEEDED
Status: DISCONTINUED | OUTPATIENT
Start: 2020-11-24 | End: 2020-11-24 | Stop reason: HOSPADM

## 2020-11-24 RX ORDER — SODIUM CHLORIDE 0.9 % (FLUSH) 0.9 %
3 SYRINGE (ML) INJECTION EVERY 12 HOURS SCHEDULED
Status: DISCONTINUED | OUTPATIENT
Start: 2020-11-24 | End: 2020-11-24 | Stop reason: HOSPADM

## 2020-11-24 RX ORDER — FENTANYL CITRATE 50 UG/ML
50 INJECTION, SOLUTION INTRAMUSCULAR; INTRAVENOUS
Status: DISCONTINUED | OUTPATIENT
Start: 2020-11-24 | End: 2020-11-24 | Stop reason: HOSPADM

## 2020-11-24 RX ORDER — SODIUM CHLORIDE, SODIUM LACTATE, POTASSIUM CHLORIDE, CALCIUM CHLORIDE 600; 310; 30; 20 MG/100ML; MG/100ML; MG/100ML; MG/100ML
9 INJECTION, SOLUTION INTRAVENOUS CONTINUOUS
Status: DISCONTINUED | OUTPATIENT
Start: 2020-11-24 | End: 2020-11-24 | Stop reason: HOSPADM

## 2020-11-24 RX ORDER — ROPIVACAINE HYDROCHLORIDE 2 MG/ML
INJECTION, SOLUTION EPIDURAL; INFILTRATION; PERINEURAL
Status: COMPLETED | OUTPATIENT
Start: 2020-11-24 | End: 2020-11-24

## 2020-11-24 RX ORDER — ROPIVACAINE HYDROCHLORIDE 5 MG/ML
INJECTION, SOLUTION EPIDURAL; INFILTRATION; PERINEURAL
Status: COMPLETED | OUTPATIENT
Start: 2020-11-24 | End: 2020-11-24

## 2020-11-24 RX ORDER — LIDOCAINE HYDROCHLORIDE 10 MG/ML
INJECTION, SOLUTION INFILTRATION; PERINEURAL AS NEEDED
Status: DISCONTINUED | OUTPATIENT
Start: 2020-11-24 | End: 2020-11-24 | Stop reason: HOSPADM

## 2020-11-24 RX ORDER — SODIUM CHLORIDE 0.9 % (FLUSH) 0.9 %
3-10 SYRINGE (ML) INJECTION AS NEEDED
Status: DISCONTINUED | OUTPATIENT
Start: 2020-11-24 | End: 2020-11-24 | Stop reason: HOSPADM

## 2020-11-24 RX ORDER — ONDANSETRON 4 MG/1
4 TABLET, FILM COATED ORAL EVERY 8 HOURS PRN
Qty: 30 TABLET | Refills: 0 | Status: SHIPPED | OUTPATIENT
Start: 2020-11-24 | End: 2021-02-17

## 2020-11-24 RX ORDER — OXYCODONE AND ACETAMINOPHEN 7.5; 325 MG/1; MG/1
1 TABLET ORAL ONCE AS NEEDED
Status: DISCONTINUED | OUTPATIENT
Start: 2020-11-24 | End: 2020-11-24 | Stop reason: HOSPADM

## 2020-11-24 RX ORDER — ONDANSETRON 2 MG/ML
4 INJECTION INTRAMUSCULAR; INTRAVENOUS ONCE AS NEEDED
Status: DISCONTINUED | OUTPATIENT
Start: 2020-11-24 | End: 2020-11-24 | Stop reason: HOSPADM

## 2020-11-24 RX ORDER — PROPOFOL 10 MG/ML
VIAL (ML) INTRAVENOUS AS NEEDED
Status: DISCONTINUED | OUTPATIENT
Start: 2020-11-24 | End: 2020-11-24 | Stop reason: SURG

## 2020-11-24 RX ORDER — HYDROCODONE BITARTRATE AND ACETAMINOPHEN 7.5; 325 MG/1; MG/1
1-2 TABLET ORAL EVERY 4 HOURS PRN
Qty: 42 TABLET | Refills: 0 | Status: SHIPPED | OUTPATIENT
Start: 2020-11-24 | End: 2021-04-26

## 2020-11-24 RX ADMIN — HYDROCODONE BITARTRATE AND ACETAMINOPHEN 1 TABLET: 7.5; 325 TABLET ORAL at 14:59

## 2020-11-24 RX ADMIN — SODIUM CHLORIDE, POTASSIUM CHLORIDE, SODIUM LACTATE AND CALCIUM CHLORIDE 9 ML/HR: 600; 310; 30; 20 INJECTION, SOLUTION INTRAVENOUS at 09:25

## 2020-11-24 RX ADMIN — PROPOFOL 20 MG: 10 INJECTION, EMULSION INTRAVENOUS at 10:29

## 2020-11-24 RX ADMIN — PROPOFOL 40 MG: 10 INJECTION, EMULSION INTRAVENOUS at 10:27

## 2020-11-24 RX ADMIN — ROPIVACAINE HYDROCHLORIDE 20 ML: 5 INJECTION, SOLUTION EPIDURAL; INFILTRATION; PERINEURAL at 10:04

## 2020-11-24 RX ADMIN — DEXAMETHASONE SODIUM PHOSPHATE 4 MG: 4 INJECTION INTRA-ARTICULAR; INTRALESIONAL; INTRAMUSCULAR; INTRAVENOUS; SOFT TISSUE at 10:04

## 2020-11-24 RX ADMIN — FENTANYL CITRATE 50 MCG: 50 INJECTION, SOLUTION INTRAMUSCULAR; INTRAVENOUS at 09:51

## 2020-11-24 RX ADMIN — MIDAZOLAM 1 MG: 1 INJECTION INTRAMUSCULAR; INTRAVENOUS at 09:51

## 2020-11-24 RX ADMIN — ROPIVACAINE HYDROCHLORIDE 10 ML: 2 INJECTION, SOLUTION EPIDURAL; INFILTRATION at 10:04

## 2020-11-24 RX ADMIN — MIDAZOLAM 1 MG: 1 INJECTION INTRAMUSCULAR; INTRAVENOUS at 11:11

## 2020-11-24 NOTE — NURSING NOTE
TALKED WITH DR KING PT VERY ANXIOUS C/O OF TROUB BREATHIN SINCE THE BLOCK DR DE LA CRUZ TALKED WITH MIKE

## 2020-11-24 NOTE — NURSING NOTE
PT remains very anxious stating I can't breath between gasping breaths. C/o of nausea vomitted a small amount Dr De La Cruz and Dr Newsome at bedside feels like it is safe to do manipulation. Dr Koenig arrived signed off

## 2020-11-24 NOTE — ANESTHESIA POSTPROCEDURE EVALUATION
Patient: Ashely Moore    Procedure Summary     Date: 11/24/20 Room / Location: [facility] PREOP MANIPS /  OSCAR OR O[facility]    Anesthesia Start: 1017 Anesthesia Stop: 1046    Procedure: SHOULDER MANIPULATION and injection (Right ) Diagnosis:       Adhesive capsulitis of right shoulder      (Adhesive capsulitis of right shoulder [M75.01])    Surgeon: Gildardo Koenig MD Provider: Raymond Newsome MD    Anesthesia Type: MAC ASA Status: 4          Anesthesia Type: MAC    Vitals  Vitals Value Taken Time   /79 11/24/20 1221   Temp     Pulse 80 11/24/20 1220   Resp 20 11/24/20 1220   SpO2 91 % 11/24/20 1222           Post Anesthesia Care and Evaluation    Patient location during evaluation: bedside  Patient participation: complete - patient participated  Level of consciousness: awake  Pain score: 1  Pain management: adequate  Airway patency: patent  Anesthetic complications: No anesthetic complications  PONV Status: controlled  Cardiovascular status: acceptable  Respiratory status: acceptable  Hydration status: acceptable    Comments: --------------------            11/24/20               1430     --------------------   BP:                  Pulse:      83       Resp:       20       Temp:                SpO2:      92%      --------------------      Had significant sensation of soa from the phrenic nerve involvement from her block  Much better now and ready to go home

## 2020-11-24 NOTE — NURSING NOTE
Pt says breathing easier now, no more grunting states not back to normal but feels like she wants to go home. Fan has helped continue to monitor.

## 2020-11-24 NOTE — ANESTHESIA PREPROCEDURE EVALUATION
Anesthesia Evaluation     NPO Solid Status: > 8 hours             Airway   Mallampati: III  TM distance: >3 FB  Neck ROM: full  Possible difficult intubation  Dental - normal exam     Pulmonary - normal exam   (+) sleep apnea,   Cardiovascular - normal exam    (+) pacemaker ICD, hypertension, CHF ,       Neuro/Psych  (+) psychiatric history Anxiety,     GI/Hepatic/Renal/Endo    (+) morbid obesity,      Musculoskeletal     Abdominal  - normal exam   Substance History      OB/GYN          Other        ROS/Med Hx Other: Nonischemic dilated cardiomyopathy    Tachy hx s/p ablation                  Anesthesia Plan    ASA 4     MAC   (Isb popc)  intravenous induction     Anesthetic plan, all risks, benefits, and alternatives have been provided, discussed and informed consent has been obtained with: patient.    Plan discussed with CRNA.

## 2020-11-24 NOTE — NURSING NOTE
"Pt continues to be very anxious stating \"I Can't breath\"  Dr Newsome and Dr De La Cruz at bedside rr 32 c/o stomach hurting sitting upright had some nausea and vomitting.  "

## 2020-11-24 NOTE — NURSING NOTE
Talked with Dr De La Cruz about continued difficulty to breath pulling 500-750 on IS decreased breath sound on right side, only able to maintian o2 sat 90 without o2. Will place on o2 and continue to monitor as the block continues to wear off.

## 2020-11-24 NOTE — ANESTHESIA PROCEDURE NOTES
Peripheral Block    Pre-sedation assessment completed: 11/24/2020 9:54 AM    Patient reassessed immediately prior to procedure    Patient location during procedure: pre-op  Start time: 11/24/2020 9:54 AM  Stop time: 11/24/2020 10:04 AM  Reason for block: at surgeon's request and post-op pain management  Performed by  Anesthesiologist: Jose Elias De La Cruz MD  Preanesthetic Checklist  Completed: patient identified, site marked, surgical consent, pre-op evaluation, timeout performed, IV checked, risks and benefits discussed and monitors and equipment checked  Prep:  Pt Position: supine  Sterile barriers:cap, gloves, mask and sterile barriers  Prep: ChloraPrep  Patient monitoring: blood pressure monitoring, continuous pulse oximetry and EKG  Procedure  Sedation:yes  Performed under: local infiltration  Guidance:ultrasound guided  ULTRASOUND INTERPRETATION.  Using ultrasound guidance a 22 G gauge needle was placed in close proximity to the brachial plexus nerve, at which point, under ultrasound guidance anesthetic was injected in the area of the nerve and spread of the anesthesia was seen on ultrasound in close proximity thereto.  There were no abnormalities seen on ultrasound; a digital image was taken; and the patient tolerated the procedure with no complications. Images:still images obtained    Laterality:right  Block Type:interscalene  Injection Technique:single-shot  Needle Type:echogenic  Needle Gauge:22 G  Resistance on Injection: less than 15 psi    Medications Used: dexamethasone (DECADRON) injection, 4 mg  ropivacaine (NAROPIN) 0.5 % injection, 20 mL  ropivacaine (NAROPIN) 0.2 % injection, 10 mL  Med admintered at 11/24/2020 10:04 AM      Post Assessment  Injection Assessment: negative aspiration for heme, no paresthesia on injection and incremental injection  Patient Tolerance:comfortable throughout block  Complications:no

## 2020-11-24 NOTE — NURSING NOTE
Pt wanting to go home trying to get to PT appt continues with SOB but pt states she feels ok to go home. Always a little short of breath Dr De La Cruz spoke with pt and ok to D/C instructions discussed with  over the phone.

## 2020-11-24 NOTE — OP NOTE
Orthopaedic Operative Note    Facility: [facility]    Patient: Ashely Moore    Medical Record Number: [id number]    YOB: 1970    Dictating Surgeon: Gildardo Koenig M.D.*    Primary Care Physician: Sergio Ribeiro MD    Date of Operation: 11/24/2020    Pre-Operative Diagnosis:  Right shoulder adhesive capsulitis    Post-Operative Diagnosis:  Right shoulder adhesive capsulitis     Procedure Performed:  Right shoulder manipulation under anesthesia and intra-articular corticosteroid injection    Surgeon: Gildardo Koenig MD     Assistant: None    Anesthesia: Regional followed by monitored anesthesia care    Complications: None.     Estimated Blood Loss: None    Implants: None    Specimens: * No orders in the log *    Brief Operative Indication:  The patient had a long history of recalcitrant right shoulder adhesive capsulitis which had been nonresponsive to conservative treatment.  The risks, benefits, and alternatives to a manipulation under anesthesia and intra-articular injection were carefully discussed with the patient.  Specifically, we discussed the risk of infection related to the injection, fracture from the manipulation, persistent or worsened pain after the procedure, persistent or worsened arthrofibrosis potentially necessitating further surgery.  We also discussed the risk of iatrogenic injury to nearby nerves and blood vessels, particularly the axillary and/or musculocutaneous nerves which could result in permanent weakness, numbness, and dysfunction.  Lastly, we talked about RSD, DVT, PE, and anesthesia related complications.  The patient acknowledged understanding of this information and consented to proceed.    Description of the procedure in detail:  The patient and operative site were identified in the preoperative holding area.  Adequate regional anesthesia of the right shoulder was administered.  This did compromise her phrenic nerve and she was feeling  short of breath.  We monitored her closely and her oxygen saturations remained 98 to 100%.  We made sure that she was comfortable and determined to proceed.  Following this, a timeout was taken in which the patient participated.  Adequate monitored anesthesia care was then administered.  An examination under anesthesia was then performed.  The patient demonstrated significant loss of motion in the shoulder compared to the contralateral side.  Over a period of several minutes, gradual pressure was applied to bring the arm up into maximal forward elevation.  I performed this gradually so as to hopefully minimize the risk of iatrogenic fracture.  I could both hear and feel the adhesions break up.  I was able to achieve full fluid forward elevation.  Next, the patient's arm was brought out into horizontal abduction followed by internal rotation.  Again, I could hear and feel the adhesions break up and was able to achieve full fluid passive motion.  Lastly, the patient's arm was brought into cross body adduction and then external rotation with the arm at the side.      Once the manipulation was completed, the anterior aspect of the shoulder was prepped with a ChloraPrep.  I allowed that to dry and then an intra-articular injection of 80 mg Depo-Medrol and approximately 4 cc of half percent bupivacaine with epinephrine was performed.  The patient tolerated the procedure well.  There were no complications.  A sterile dressing was applied to the site of the injection.  The arm was placed in a sling.  The patient was awakened in good condition.  We will monitor her post procedure to make sure that she is breathing better and comfortable before discharging her home.    Gildardo Koenig MD  11/24/20

## 2020-11-24 NOTE — BRIEF OP NOTE
MANIPULATION OF  Progress Note    Ashely Moore  11/24/2020    Pre-op Diagnosis:   Adhesive capsulitis of right shoulder [M75.01]       Post-Op Diagnosis Codes:     * Adhesive capsulitis of right shoulder [M75.01]    Procedure/CPT® Codes:        Procedure(s):  SHOULDER MANIPULATION and injection    Surgeon(s):  Gildardo Koenig MD    Anesthesia: Monitored Anesthesia Care with Regional    Staff:   Circulator: Savanah Perkins RN         Estimated Blood Loss: none    Urine Voided: * No values recorded between 11/24/2020 10:27 AM and 11/24/2020 10:35 AM *    Specimens:                None          Drains: * No LDAs found *    Findings: see dictation    Complications: none          Gildardo Koenig MD     Date: 11/24/2020  Time: 10:35 EST

## 2020-11-25 ENCOUNTER — TELEPHONE (OUTPATIENT)
Dept: ORTHOPEDIC SURGERY | Facility: CLINIC | Age: 50
End: 2020-11-25

## 2020-11-25 NOTE — TELEPHONE ENCOUNTER
I spoke with her.  She seems to be doing well postoperatively.  She is breathing better.  She says her nerve block is slowly starting to wear off.  She will follow-up as scheduled.

## 2020-12-02 ENCOUNTER — OFFICE VISIT (OUTPATIENT)
Dept: ORTHOPEDIC SURGERY | Facility: CLINIC | Age: 50
End: 2020-12-02

## 2020-12-02 VITALS — WEIGHT: 274 LBS | BODY MASS INDEX: 45.65 KG/M2 | HEIGHT: 65 IN | TEMPERATURE: 97.2 F

## 2020-12-02 DIAGNOSIS — Z09 SURGERY FOLLOW-UP: Primary | ICD-10-CM

## 2020-12-02 PROCEDURE — 99024 POSTOP FOLLOW-UP VISIT: CPT | Performed by: ORTHOPAEDIC SURGERY

## 2020-12-02 NOTE — PROGRESS NOTES
Ashely Moore : 1970 MRN: [id number] DATE: 2020    CC: 1 week s/p right shoulder manipulation with intra-articular injection    HPI: Patient returns to clinic today for follow up.  Reports pain is well controlled.  Her motion is not as good as she had hoped.  She is honest with me about the fact that she only attended 1 therapy session.  She also tells me that her mother  unexpectedly this past week.    Vitals:    20 1637   Temp: 97.2 °F (36.2 °C)       Exam:  Arm and forearm soft.  Shoulder moves fluidly.  Her motion is better but not full.  Forward elevation is 170 degrees, abduction is over 100 degrees, external rotation is about 60 degrees but her internal rotation is limited to between her side and back pocket versus roughly T12 on the contralateral side..  Good motor and sensory function distally.  Palpable radial pulse with good capillary refill.      Imaging   none    Impression:  1 week s/p right shoulder manipulation and injection    Plan:    1.  Continue PT.  2.  I will see her back in another month to check her progress.    Gildardo Koenig MD

## 2021-01-06 ENCOUNTER — OFFICE VISIT (OUTPATIENT)
Dept: ORTHOPEDIC SURGERY | Facility: CLINIC | Age: 51
End: 2021-01-06

## 2021-01-06 VITALS — BODY MASS INDEX: 43.39 KG/M2 | TEMPERATURE: 97.3 F | WEIGHT: 270 LBS | HEIGHT: 66 IN

## 2021-01-06 DIAGNOSIS — Z09 SURGERY FOLLOW-UP: Primary | ICD-10-CM

## 2021-01-06 PROCEDURE — 99024 POSTOP FOLLOW-UP VISIT: CPT | Performed by: ORTHOPAEDIC SURGERY

## 2021-01-06 NOTE — PROGRESS NOTES
Patient returns today for follow-up of her right shoulder.  She says her motion is better but she is still having pain in the area of her biceps.  This pain has been relatively stable for the last month.  The pain is worse with certain reaching and lifting movements.    Her motion is much better.  She is still struggling with internal rotation but her elevation, abduction and external rotation are all virtually symmetric to the contralateral side.  No significant tenderness on exam.  Negative Neer, Blanchard, Hamlin's, speeds and Yergason's maneuvers.    I did go back and review her previous CT of the right shoulder.  She has moderate acromioclavicular arthritis but no obvious cuff, labral or chondral pathology.  She cannot have an MRI.    Assessment: Follow-up now 5 weeks status post right shoulder manipulation and injection    Plan: She reports persistent pain in the area of the biceps.  I was unable to reproduce that on exam today.  We discussed the possible injection.  She just had an injection 5 weeks ago and I am reluctant to repeat that today so soon after the last injection.  I think it is best that we give it another month or so.  I encouraged her to continue working on her exercises on her own.  I will see her back in a month.  If no better, we may consider another injection at that time.  Unfortunately, if this pain persists, we may have to consider arthroscopic intervention.  An MRI would be helpful but unfortunately that is not an option.

## 2021-02-02 ENCOUNTER — OFFICE VISIT CONVERTED (OUTPATIENT)
Dept: FAMILY MEDICINE CLINIC | Age: 51
End: 2021-02-02
Attending: NURSE PRACTITIONER

## 2021-02-02 ENCOUNTER — HOSPITAL ENCOUNTER (OUTPATIENT)
Dept: OTHER | Facility: HOSPITAL | Age: 51
Discharge: HOME | End: 2021-02-02
Attending: NURSE PRACTITIONER

## 2021-02-02 LAB
ALBUMIN SERPL-MCNC: 4.1 G/DL (ref 3.5–5)
ALBUMIN/GLOB SERPL: 1.4 {RATIO} (ref 1.4–2.6)
ALP SERPL-CCNC: 96 U/L (ref 53–141)
ALT SERPL-CCNC: 24 U/L (ref 10–40)
ANION GAP SERPL CALC-SCNC: 22 MMOL/L (ref 8–19)
AST SERPL-CCNC: 25 U/L (ref 15–50)
BASOPHILS # BLD MANUAL: 0.01 10*3/UL (ref 0–0.2)
BASOPHILS NFR BLD MANUAL: 0.1 % (ref 0–3)
BILIRUB SERPL-MCNC: 0.99 MG/DL (ref 0.2–1.3)
BUN SERPL-MCNC: 34 MG/DL (ref 5–25)
BUN/CREAT SERPL: 24 {RATIO} (ref 6–20)
CALCIUM SERPL-MCNC: 10.1 MG/DL (ref 8.7–10.4)
CHLORIDE SERPL-SCNC: 99 MMOL/L (ref 99–111)
CONV CO2: 20 MMOL/L (ref 22–32)
CONV TOTAL PROTEIN: 7 G/DL (ref 6.3–8.2)
CREAT UR-MCNC: 1.44 MG/DL (ref 0.5–0.9)
DEPRECATED RDW RBC AUTO: 54.9 FL
EOSINOPHIL # BLD MANUAL: 0.14 10*3/UL (ref 0–0.7)
EOSINOPHIL NFR BLD MANUAL: 1.8 % (ref 0–7)
ERYTHROCYTE [DISTWIDTH] IN BLOOD BY AUTOMATED COUNT: 15.7 % (ref 11.5–14.5)
GFR SERPLBLD BASED ON 1.73 SQ M-ARVRAT: 42 ML/MIN/{1.73_M2}
GLOBULIN UR ELPH-MCNC: 2.9 G/DL (ref 2–3.5)
GLUCOSE SERPL-MCNC: 139 MG/DL (ref 65–99)
GRANS (ABSOLUTE): 5.81 10*3/UL (ref 2–8)
GRANS: 73 % (ref 30–85)
HBA1C MFR BLD: 14.6 G/DL (ref 12–16)
HCT VFR BLD AUTO: 45 % (ref 37–47)
IMM GRANULOCYTES # BLD: 0.01 10*3/UL (ref 0–0.54)
IMM GRANULOCYTES NFR BLD: 0.1 % (ref 0–0.43)
LYMPHOCYTES # BLD MANUAL: 1.34 10*3/UL (ref 1–5)
LYMPHOCYTES NFR BLD MANUAL: 8.2 % (ref 3–10)
MCH RBC QN AUTO: 32.6 PG (ref 27–31)
MCHC RBC AUTO-ENTMCNC: 32.4 G/DL (ref 33–37)
MCV RBC AUTO: 100.4 FL (ref 81–99)
MONOCYTES # BLD AUTO: 0.65 10*3/UL (ref 0.2–1.2)
OSMOLALITY SERPL CALC.SUM OF ELEC: 292 MOSM/KG (ref 273–304)
PLATELET # BLD AUTO: 245 10*3/UL (ref 130–400)
PMV BLD AUTO: 10.1 FL (ref 7.4–10.4)
POTASSIUM SERPL-SCNC: 4.5 MMOL/L (ref 3.5–5.3)
RBC # BLD AUTO: 4.48 10*6/UL (ref 4.2–5.4)
SODIUM SERPL-SCNC: 136 MMOL/L (ref 135–147)
T4 FREE SERPL-MCNC: 1.3 NG/DL (ref 0.9–1.8)
TSH SERPL-ACNC: 2.45 M[IU]/L (ref 0.27–4.2)
VARIANT LYMPHS NFR BLD MANUAL: 16.8 % (ref 20–45)
WBC # BLD AUTO: 7.96 10*3/UL (ref 4.8–10.8)

## 2021-02-03 ENCOUNTER — OFFICE VISIT (OUTPATIENT)
Dept: ORTHOPEDIC SURGERY | Facility: CLINIC | Age: 51
End: 2021-02-03

## 2021-02-03 VITALS — WEIGHT: 270 LBS | BODY MASS INDEX: 44.98 KG/M2 | HEIGHT: 65 IN | TEMPERATURE: 97.3 F

## 2021-02-03 DIAGNOSIS — M25.511 CHRONIC RIGHT SHOULDER PAIN: Primary | ICD-10-CM

## 2021-02-03 DIAGNOSIS — G89.29 CHRONIC RIGHT SHOULDER PAIN: Primary | ICD-10-CM

## 2021-02-03 PROCEDURE — 99214 OFFICE O/P EST MOD 30 MIN: CPT | Performed by: ORTHOPAEDIC SURGERY

## 2021-02-03 NOTE — PROGRESS NOTES
Patient:Ashely Moore    YOB: 1970    Medical Record Number:[id number]    Chief Complaints: Persistent right shoulder pain    History of Present Illness:     50 y.o. female patient who presents for follow-up of her shoulder.  She is now roughly 3 months out from her manipulation.  Her motion has recovered but she is still having discomfort.  She reports that a constant throbbing pain in the front and side of her shoulder.  The symptoms are worse with reaching and lifting.  She is very frustrated.  She has been diligent about her therapy.  She feels like it has helped with her motion but not her pain.    Allergies:No Known Allergies    Home Medications:    Current Outpatient Medications:   •  aspirin (aspirin) 81 MG EC tablet, Take 81 mg by mouth Daily. HOLD PER CARDIOLOGY GUIDELINE, Disp: , Rfl:   •  docusate sodium (COLACE) 100 MG capsule, Take 1 capsule by mouth 2 (Two) Times a Day., Disp: 60 capsule, Rfl: 0  •  furosemide (LASIX) 40 MG tablet, Take 40 mg by mouth Daily., Disp: , Rfl:   •  metoprolol tartrate (LOPRESSOR) 50 MG tablet, Take 50 mg by mouth 2 (Two) Times a Day., Disp: , Rfl:   •  ondansetron (Zofran) 4 MG tablet, Take 1 tablet by mouth Every 8 (Eight) Hours As Needed for Nausea or Vomiting., Disp: 30 tablet, Rfl: 0  •  potassium chloride (KLOR-CON) 20 MEQ packet, Take 20 mEq by mouth Daily., Disp: , Rfl:   •  sacubitril-valsartan (Entresto) 49-51 MG tablet, Take 1 tablet by mouth 2 (Two) Times a Day., Disp: , Rfl:   •  sertraline (ZOLOFT) 50 MG tablet, Take 50 mg by mouth Every Night., Disp: , Rfl:   •  spironolactone (ALDACTONE) 25 MG tablet, Take 25 mg by mouth Daily. 1/2 (12.5 mg) tablet daily, Disp: , Rfl:   •  HYDROcodone-acetaminophen (NORCO) 7.5-325 MG per tablet, Take 1-2 tablets by mouth Every 4 (Four) Hours As Needed for Moderate Pain  (Pain)., Disp: 42 tablet, Rfl: 0    Past Medical History:   Diagnosis Date   • Anxiety    • CHF (congestive heart failure) (CMS/[facility])    •  Depression with anxiety    • Hypertension    • Nonischemic dilated cardiomyopathy (CMS/HCC)    • Shoulder pain, right    • Sleep apnea     BIPAP       Past Surgical History:   Procedure Laterality Date   • CARDIAC ABLATION     • CARDIAC DEFIBRILLATOR PLACEMENT  2010    New battery placed 2018    • JOINT MANIPULATION Right 2020    Procedure: SHOULDER MANIPULATION and injection;  Surgeon: Gildardo Koenig MD;  [facility]: [facility] OR [facility];  Service: Orthopedics;  Laterality: Right;   • TEETH EXTRACTION      NOT WISDOM TEETH       Social History     Occupational History   • Not on file   Tobacco Use   • Smoking status: Former Smoker     Packs/day: 1.00     Years: 20.00     Pack years: 20.00     Types: Cigarettes     Quit date: 2014     Years since quittin.2   • Smokeless tobacco: Never Used   Substance and Sexual Activity   • Alcohol use: Not Currently     Frequency: Never   • Drug use: Never   • Sexual activity: Defer      Social History     Social History Narrative   • Not on file       Family History   Problem Relation Age of Onset   • Heart disease Father    • Heart disease Brother    • Heart disease Paternal Grandmother    • Heart disease Brother    • Malig Hyperthermia Neg Hx        Review of Systems:      Constitutional: Denies fever, shaking or chills   Eyes: Denies change in visual acuity   HEENT: Denies nasal congestion or sore throat   Respiratory: Denies cough or shortness of breath   Cardiovascular: Denies chest pain or edema  Endocrine: Denies tremors, palpitations, intolerance of heat or cold, polyuria, polydipsia.  GI: Denies abdominal pain, nausea, vomiting, bloody stools or diarrhea  : Denies frequency, urgency, incontinence, retention, or nocturia.  Musculoskeletal: Denies numbness, tingling or loss of motor function except as above  Integument: Denies rash, lesion or ulceration   Neurologic: Denies headache or focal weakness, deficits  Heme: Denies spontaneous or  "excessive bleeding, epistaxis, hematuria, melena, fatigue, enlarged or tender lymph nodes.      All other pertinent positives and negatives as noted above in HPI.    Physical Exam:50 y.o. female  Vitals:    02/03/21 1600   Temp: 97.3 °F (36.3 °C)   Weight: 122 kg (270 lb)   Height: 165.1 cm (65\")       General:  Patient is awake and alert.  Appears in no acute distress or discomfort.    Psych:  Affect and demeanor are appropriate.    Neck: No tenderness.  Good motion.  Negative Spurling's bilaterally.    Extremities: Right shoulder: Skin is benign.  Focal tenderness over the biceps and rotator interval.  No tenderness over the acromioclavicular joint.  Her motion is now full and symmetric to the contralateral side.  She has pain at extremes of forward elevation and internal rotation.  Positive Neer, Blanchard, speeds and Wibaux's maneuvers.  Yergason's causes her some mild discomfort but is not frankly positive.  She has discomfort with elevation and abduction but no bianka weakness.  Intact motor and sensory function in her hand and wrist.  Brisk capillary refill.    Imaging: Previous x-rays and CT scan of the right shoulder reviewed.  The CT shows moderate acromioclavicular arthritis but no other significant findings.      Assessment/Plan: Persistent right shoulder pain with suspected biceps tendinitis, impingement and possible rotator cuff tear    Her exam suggest that her pain is likely coming the biceps.  She has positive impingement maneuvers and some discomfort with resistive testing of her cuff.  CT scan seems to suggest her cuff is intact but she could have a small or partial tear.  I would love to get an MRI on her and I think that would be very beneficial.  Unfortunately, that is not an option.  We discussed further options for her at this point.  She feels like conservative treatment has been ineffective.  She expressed a desire to move forward with surgical options.  She is a candidate for diagnostic " arthroscopy.  Tentative plan would be to perform a subacromial decompression, possible biceps tenotomy versus tenodesis and then address any cuff pathology noted at the time of surgery.  Her CT suggest acromioclavicular arthritis but she does not hurt in that area and I do not think this is symptomatic for her.    We had a thorough discussion regarding the risks, benefits and alternatives to the proposed procedure.  I explained that surgical risks include infection, hematoma, persistent pain and/or loss of motion, iatrogenic nerve and/or blood vessel injury resulting in permanent weakness, numbness or dysfunction, RSD, DVT, PE, positioning related neuropraxia, and anesthesia related complications resulting in death.  We further discussed the possible need to address any rotator cuff, labral and/or biceps pathology, if found at the time of the surgery.  I explained to patient that I would certainly plan to address this in the same surgical setting if we were to identify any unexpected pathology.  We discussed the risk associated with this including possible anchor related complications including failure of fixation, loosening, cutout of the anchors, chondrolysis, re-tear necessitating revision surgery.  We also discussed a possible tenotomy versus tenodesis of the biceps, just depending on where the pathology is located.  We discussed the fact that if the biceps demonstrates significant pathology in the groove that I would plan to perform a tenotomy which could result in vanessa deformity or persistent pain, weakness or cramping.  We also discussed possible risks with a tenodesis as well including screw related complications including cutout, chondrolysis, failure of fixation with re-tear of the biceps, fracture and possible iatrogenic nerve injury.  The patient voiced understanding of the risks, benefits, and alternative forms of treatment that were discussed and the patient consents to proceed.  No guarantees were  given regarding the results of this procedure.  I did answer all the questions posed by the patient in detail.    Gildardo Koenig MD    02/03/2021

## 2021-02-04 RX ORDER — CEFAZOLIN SODIUM 2 G/100ML
2 INJECTION, SOLUTION INTRAVENOUS ONCE
Status: CANCELLED | OUTPATIENT
Start: 2021-03-23 | End: 2021-02-04

## 2021-02-05 PROBLEM — M25.511 CHRONIC RIGHT SHOULDER PAIN: Status: ACTIVE | Noted: 2021-02-05

## 2021-02-05 PROBLEM — G89.29 CHRONIC RIGHT SHOULDER PAIN: Status: ACTIVE | Noted: 2021-02-05

## 2021-02-11 ENCOUNTER — TRANSCRIBE ORDERS (OUTPATIENT)
Dept: PREADMISSION TESTING | Facility: HOSPITAL | Age: 51
End: 2021-02-11

## 2021-02-11 DIAGNOSIS — Z01.818 OTHER SPECIFIED PRE-OPERATIVE EXAMINATION: Primary | ICD-10-CM

## 2021-02-17 ENCOUNTER — APPOINTMENT (OUTPATIENT)
Dept: PREADMISSION TESTING | Facility: HOSPITAL | Age: 51
End: 2021-02-17

## 2021-02-17 VITALS
WEIGHT: 278.8 LBS | RESPIRATION RATE: 16 BRPM | OXYGEN SATURATION: 95 % | BODY MASS INDEX: 44.81 KG/M2 | DIASTOLIC BLOOD PRESSURE: 77 MMHG | HEIGHT: 66 IN | SYSTOLIC BLOOD PRESSURE: 102 MMHG | HEART RATE: 102 BPM | TEMPERATURE: 98.6 F

## 2021-02-17 LAB
ANION GAP SERPL CALCULATED.3IONS-SCNC: 13.6 MMOL/L (ref 5–15)
BUN SERPL-MCNC: 22 MG/DL (ref 6–20)
BUN/CREAT SERPL: 23.2 (ref 7–25)
CALCIUM SPEC-SCNC: 9.9 MG/DL (ref 8.6–10.5)
CHLORIDE SERPL-SCNC: 102 MMOL/L (ref 98–107)
CO2 SERPL-SCNC: 24.4 MMOL/L (ref 22–29)
CREAT SERPL-MCNC: 0.95 MG/DL (ref 0.57–1)
DEPRECATED RDW RBC AUTO: 53.2 FL (ref 37–54)
ERYTHROCYTE [DISTWIDTH] IN BLOOD BY AUTOMATED COUNT: 15.1 % (ref 12.3–15.4)
GFR SERPL CREATININE-BSD FRML MDRD: 62 ML/MIN/1.73
GLUCOSE SERPL-MCNC: 122 MG/DL (ref 65–99)
HCT VFR BLD AUTO: 47.4 % (ref 34–46.6)
HGB BLD-MCNC: 15.3 G/DL (ref 12–15.9)
MCH RBC QN AUTO: 31.2 PG (ref 26.6–33)
MCHC RBC AUTO-ENTMCNC: 32.3 G/DL (ref 31.5–35.7)
MCV RBC AUTO: 96.5 FL (ref 79–97)
PLATELET # BLD AUTO: 285 10*3/MM3 (ref 140–450)
PMV BLD AUTO: 9.7 FL (ref 6–12)
POTASSIUM SERPL-SCNC: 3.8 MMOL/L (ref 3.5–5.2)
RBC # BLD AUTO: 4.91 10*6/MM3 (ref 3.77–5.28)
SODIUM SERPL-SCNC: 140 MMOL/L (ref 136–145)
WBC # BLD AUTO: 7.22 10*3/MM3 (ref 3.4–10.8)

## 2021-02-17 PROCEDURE — 36415 COLL VENOUS BLD VENIPUNCTURE: CPT

## 2021-02-17 PROCEDURE — 85027 COMPLETE CBC AUTOMATED: CPT

## 2021-02-17 PROCEDURE — 80048 BASIC METABOLIC PNL TOTAL CA: CPT

## 2021-02-17 RX ORDER — METOPROLOL SUCCINATE 50 MG/1
50 TABLET, EXTENDED RELEASE ORAL 2 TIMES DAILY
COMMUNITY

## 2021-02-17 NOTE — DISCHARGE INSTRUCTIONS
Take the following medications the morning of surgery:  METOPROLOL    OFFICE WITH CALL WITH HOSPITAL ARRIVAL TIME       If you are on prescription narcotic pain medication to control your pain you may also take that medication the morning of surgery.    General Instructions:  • Do not eat solid food after midnight the night before surgery.  • You may drink clear liquids day of surgery but must stop at least one hour before your hospital arrival time.  • It is beneficial for you to have a clear drink that contains carbohydrates the day of surgery.  We suggest a 12 to 20 ounce bottle of Gatorade or Powerade for non-diabetic patients or a 12 to 20 ounce bottle of G2 or Powerade Zero for diabetic patients. (Pediatric patients, are not advised to drink a 12 to 20 ounce carbohydrate drink)    Clear liquids are liquids you can see through.  Nothing red in color.     Plain water                               Sports drinks  Sodas                                   Gelatin (Jell-O)  Fruit juices without pulp such as white grape juice and apple juice  Popsicles that contain no fruit or yogurt  Tea or coffee (no cream or milk added)  Gatorade / Powerade  G2 / Powerade Zero    • Infants may have breast milk up to four hours before surgery.  • Infants drinking formula may drink formula up to six hours before surgery.   • Patients who avoid smoking, chewing tobacco and alcohol for 4 weeks prior to surgery have a reduced risk of post-operative complications.  Quit smoking as many days before surgery as you can.  • Do not smoke, use chewing tobacco or drink alcohol the day of surgery.   • If applicable bring your C-PAP/ BI-PAP machine.  • Bring any papers given to you in the doctor’s office.  • Wear clean comfortable clothes.  • Do not wear contact lenses, false eyelashes or make-up.  Bring a case for your glasses.   • Bring crutches or walker if applicable.  • Remove all piercings.  Leave jewelry and any other valuables at  home.  • Hair extensions with metal clips must be removed prior to surgery.  • The Pre-Admission Testing nurse will instruct you to bring medications if unable to obtain an accurate list in Pre-Admission Testing.        If you were given a blood bank ID arm band remember to bring it with you the day of surgery.    Preventing a Surgical Site Infection:  • For 2 to 3 days before surgery, avoid shaving with a razor because the razor can irritate skin and make it easier to develop an infection.    • Any areas of open skin can increase the risk of a post-operative wound infection by allowing bacteria to enter and travel throughout the body.  Notify your surgeon if you have any skin wounds / rashes even if it is not near the expected surgical site.  The area will need assessed to determine if surgery should be delayed until it is healed.  • The night prior to surgery shower using a fresh bar of anti-bacterial soap (such as Dial) and clean washcloth.  Sleep in a clean bed with clean clothing.  Do not allow pets to sleep with you.  • Shower on the morning of surgery using a fresh bar of anti-bacterial soap (such as Dial) and clean washcloth.  Dry with a clean towel and dress in clean clothing.  • Ask your surgeon if you will be receiving antibiotics prior to surgery.  • Make sure you, your family, and all healthcare providers clean their hands with soap and water or an alcohol based hand  before caring for you or your wound.    Day of surgery:  Your arrival time is approximately two hours before your scheduled surgery time.  Upon arrival, a Pre-op nurse and Anesthesiologist will review your health history, obtain vital signs, and answer questions you may have.  The only belongings needed at this time will be a list of your home medications and if applicable your C-PAP/BI-PAP machine.  A Pre-op nurse will start an IV and you may receive medication in preparation for surgery, including something to help you relax.      Please be aware that surgery does come with discomfort.  We want to make every effort to control your discomfort so please discuss any uncontrolled symptoms with your nurse.   Your doctor will most likely have prescribed pain medications.      If you are going home after surgery you will receive individualized written care instructions before being discharged.  A responsible adult must drive you to and from the hospital on the day of your surgery and stay with you for 24 hours.  Discharge prescriptions can be filled by the hospital pharmacy during regular pharmacy hours.  If you are having surgery late in the day/evening your prescription may be e-prescribed to your pharmacy.  Please verify your pharmacy hours or chose a 24 hour pharmacy to avoid not having access to your prescription because your pharmacy has closed for the day.    If you are staying overnight following surgery, you will be transported to your hospital room following the recovery period.  [facility] has all private rooms.    If you have any questions please call Pre-Admission Testing at [phone].  Deductibles and co-payments are collected on the day of service. Please be prepared to pay the required co-pay, deductible or deposit on the day of service as defined by your plan.    Patient Education for Self-Quarantine Process    Following your COVID testing, we strongly recommend that you do not leave your home after you have been tested for COVID except to get medical care. This includes not going to work, school or to public areas.  If this is not possible for you to do please limit your activities to only required outings.  Be sure to wear a mask when you are with other people, practice social distancing and wash your hands frequently.      The following items provide additional details to keep you safe.  • Wash your hands with soap and water frequently for at least 20 seconds.   • Avoid touching your eyes, nose and mouth  with unwashed hands.  • Do not share anything - utensils, towels, food from the same bowl.   • Have your own utensils, drinking glass, dishes, towels and bedding.   • Do not have visitors.   • Do use FaceTime to stay in touch with family and friends.  • You should stay in a specific room away from others if possible.   • Stay at least 6 feet away from others in the home if you cannot have a dedicated room to yourself.   • Do not snuggle with your pet. While the CDC says there is no evidence that pets can spread COVID-19 or be infected from humans, it is probably best to avoid “petting, snuggling, being kissed or licked and sharing food (during self-quarantine)”, according to the CDC.   • Sanitize household surfaces daily. Include all high touch areas (door handles, light switches, phones, countertops, etc.)  • Do not share a bathroom with others, if possible.   • Wear a mask around others in your home if you are unable to stay in a separate room or 6 feet apart. If  you are unable to wear a mask, have your family member wear a mask if they must be within 6 feet of you.   Call your surgeon immediately if you experience any of the following symptoms:  • Sore Throat  • Shortness of Breath or difficulty breathing  • Cough  • Chills  • Body soreness or muscle pain  • Headache  • Fever  • New loss of taste or smell  • Do not arrive for your surgery ill.  Your procedure will need to be rescheduled to another time.  You will need to call your physician before the day of surgery to avoid any unnecessary exposure to hospital staff as well as other patients.

## 2021-02-18 ENCOUNTER — TELEPHONE (OUTPATIENT)
Dept: ORTHOPEDIC SURGERY | Facility: CLINIC | Age: 51
End: 2021-02-18

## 2021-02-18 NOTE — TELEPHONE ENCOUNTER
Spoke with Ashely Moore and advised I did not have any [facility] paperwork on my desk yet.

## 2021-02-18 NOTE — TELEPHONE ENCOUNTER
OFFICE SAID THEY RECEIVED THE PAPERWORK. PATIENT IS REQUESTING THAT THE PAPERWORK BE FAXED BACK TO:    [facility]    HE STATED THAT THE FAX NUMBER SHOULD BE ON THE PAPERWORK THEY RECEIVED.

## 2021-02-18 NOTE — TELEPHONE ENCOUNTER
PATIENT IS CALLING IN TO MAKE SURE [facility] PAPERWORK WAS RECEIVED.    PLEASE CALL IF IT IS IN THE OFFICE:  [phone]

## 2021-02-23 ENCOUNTER — TELEPHONE (OUTPATIENT)
Dept: ORTHOPEDICS | Facility: OTHER | Age: 51
End: 2021-02-23

## 2021-02-23 NOTE — TELEPHONE ENCOUNTER
PATIENTS  IS CALLING IN TO LET THE OFFICE KNOW THAT [facility] CARDIOLOGIST IS WANTING TO POSTPONE THE SX FOR A WEEK. CARDIOLOGIST SAYS THAT THERE IS FLUID AROUND HER HEART AND WANTS TO CLEAR THAT BEFORE SX.     PLEASE ADVISE:  [phone]

## 2021-03-08 ENCOUNTER — TRANSCRIBE ORDERS (OUTPATIENT)
Dept: PREADMISSION TESTING | Facility: HOSPITAL | Age: 51
End: 2021-03-08

## 2021-03-08 DIAGNOSIS — Z01.818 OTHER SPECIFIED PRE-OPERATIVE EXAMINATION: Primary | ICD-10-CM

## 2021-03-10 ENCOUNTER — APPOINTMENT (OUTPATIENT)
Dept: PREADMISSION TESTING | Facility: HOSPITAL | Age: 51
End: 2021-03-10

## 2021-03-10 VITALS
BODY MASS INDEX: 43.23 KG/M2 | SYSTOLIC BLOOD PRESSURE: 114 MMHG | OXYGEN SATURATION: 96 % | HEART RATE: 99 BPM | TEMPERATURE: 98.3 F | DIASTOLIC BLOOD PRESSURE: 83 MMHG | RESPIRATION RATE: 24 BRPM | HEIGHT: 66 IN | WEIGHT: 269 LBS

## 2021-03-10 LAB
ANION GAP SERPL CALCULATED.3IONS-SCNC: 12.6 MMOL/L (ref 5–15)
BUN SERPL-MCNC: 22 MG/DL (ref 6–20)
BUN/CREAT SERPL: 26.2 (ref 7–25)
CALCIUM SPEC-SCNC: 9.8 MG/DL (ref 8.6–10.5)
CHLORIDE SERPL-SCNC: 102 MMOL/L (ref 98–107)
CO2 SERPL-SCNC: 26.4 MMOL/L (ref 22–29)
CREAT SERPL-MCNC: 0.84 MG/DL (ref 0.57–1)
DEPRECATED RDW RBC AUTO: 53.7 FL (ref 37–54)
ERYTHROCYTE [DISTWIDTH] IN BLOOD BY AUTOMATED COUNT: 15.2 % (ref 12.3–15.4)
GFR SERPL CREATININE-BSD FRML MDRD: 71 ML/MIN/1.73
GLUCOSE SERPL-MCNC: 123 MG/DL (ref 65–99)
HCT VFR BLD AUTO: 50.8 % (ref 34–46.6)
HGB BLD-MCNC: 16.5 G/DL (ref 12–15.9)
MCH RBC QN AUTO: 31.3 PG (ref 26.6–33)
MCHC RBC AUTO-ENTMCNC: 32.5 G/DL (ref 31.5–35.7)
MCV RBC AUTO: 96.4 FL (ref 79–97)
PLATELET # BLD AUTO: 271 10*3/MM3 (ref 140–450)
PMV BLD AUTO: 9.7 FL (ref 6–12)
POTASSIUM SERPL-SCNC: 4.1 MMOL/L (ref 3.5–5.2)
RBC # BLD AUTO: 5.27 10*6/MM3 (ref 3.77–5.28)
SODIUM SERPL-SCNC: 141 MMOL/L (ref 136–145)
WBC # BLD AUTO: 6.86 10*3/MM3 (ref 3.4–10.8)

## 2021-03-10 PROCEDURE — 80048 BASIC METABOLIC PNL TOTAL CA: CPT

## 2021-03-10 PROCEDURE — 36415 COLL VENOUS BLD VENIPUNCTURE: CPT

## 2021-03-10 PROCEDURE — 85027 COMPLETE CBC AUTOMATED: CPT

## 2021-03-10 RX ORDER — FUROSEMIDE 80 MG
80 TABLET ORAL EVERY MORNING
COMMUNITY
End: 2021-10-04

## 2021-03-10 RX ORDER — ACETAMINOPHEN 500 MG
1000 TABLET ORAL EVERY 6 HOURS PRN
COMMUNITY
End: 2021-03-23 | Stop reason: HOSPADM

## 2021-03-10 NOTE — DISCHARGE INSTRUCTIONS
Take the following medications the morning of surgery:    metoprolol    If you are on prescription narcotic pain medication to control your pain you may also take that medication the morning of surgery.    General Instructions:  • Do not eat solid food after midnight the night before surgery.  • You may drink clear liquids day of surgery but must stop at least one hour before your hospital arrival time.  • It is beneficial for you to have a clear drink that contains carbohydrates the day of surgery.  We suggest a 12 to 20 ounce bottle of Gatorade or Powerade for non-diabetic patients or a 12 to 20 ounce bottle of G2 or Powerade Zero for diabetic patients. (Pediatric patients, are not advised to drink a 12 to 20 ounce carbohydrate drink)    Clear liquids are liquids you can see through.  Nothing red in color.     Plain water                               Sports drinks  Sodas                                   Gelatin (Jell-O)  Fruit juices without pulp such as white grape juice and apple juice  Popsicles that contain no fruit or yogurt  Tea or coffee (no cream or milk added)  Gatorade / Powerade  G2 / Powerade Zero    • Infants may have breast milk up to four hours before surgery.  • Infants drinking formula may drink formula up to six hours before surgery.   • Patients who avoid smoking, chewing tobacco and alcohol for 4 weeks prior to surgery have a reduced risk of post-operative complications.  Quit smoking as many days before surgery as you can.  • Do not smoke, use chewing tobacco or drink alcohol the day of surgery.   • If applicable bring your C-PAP/ BI-PAP machine.  • Bring any papers given to you in the doctor’s office.  • Wear clean comfortable clothes.  • Do not wear contact lenses, false eyelashes or make-up.  Bring a case for your glasses.   • Bring crutches or walker if applicable.  • Remove all piercings.  Leave jewelry and any other valuables at home.  • Hair extensions with metal clips must be removed  prior to surgery.  • The Pre-Admission Testing nurse will instruct you to bring medications if unable to obtain an accurate list in Pre-Admission Testing.        If you were given a blood bank ID arm band remember to bring it with you the day of surgery.    Preventing a Surgical Site Infection:  • For 2 to 3 days before surgery, avoid shaving with a razor because the razor can irritate skin and make it easier to develop an infection.    • Any areas of open skin can increase the risk of a post-operative wound infection by allowing bacteria to enter and travel throughout the body.  Notify your surgeon if you have any skin wounds / rashes even if it is not near the expected surgical site.  The area will need assessed to determine if surgery should be delayed until it is healed.  • The night prior to surgery shower using a fresh bar of anti-bacterial soap (such as Dial) and clean washcloth.  Sleep in a clean bed with clean clothing.  Do not allow pets to sleep with you.  • Shower on the morning of surgery using a fresh bar of anti-bacterial soap (such as Dial) and clean washcloth.  Dry with a clean towel and dress in clean clothing.  • Ask your surgeon if you will be receiving antibiotics prior to surgery.  • Make sure you, your family, and all healthcare providers clean their hands with soap and water or an alcohol based hand  before caring for you or your wound.    Day of surgery:3/23/2021   Hospital to call with time of arrival  Your arrival time is approximately two hours before your scheduled surgery time.  Upon arrival, a Pre-op nurse and Anesthesiologist will review your health history, obtain vital signs, and answer questions you may have.  The only belongings needed at this time will be a list of your home medications and if applicable your C-PAP/BI-PAP machine.  A Pre-op nurse will start an IV and you may receive medication in preparation for surgery, including something to help you relax.     Please  be aware that surgery does come with discomfort.  We want to make every effort to control your discomfort so please discuss any uncontrolled symptoms with your nurse.   Your doctor will most likely have prescribed pain medications.      If you are going home after surgery you will receive individualized written care instructions before being discharged.  A responsible adult must drive you to and from the hospital on the day of your surgery and stay with you for 24 hours.  Discharge prescriptions can be filled by the hospital pharmacy during regular pharmacy hours.  If you are having surgery late in the day/evening your prescription may be e-prescribed to your pharmacy.  Please verify your pharmacy hours or chose a 24 hour pharmacy to avoid not having access to your prescription because your pharmacy has closed for the day.    If you are staying overnight following surgery, you will be transported to your hospital room following the recovery period.  [facility]    If you have any questions please call Pre-Admission Testing at [phone].  Deductibles and co-payments are collected on the day of service. Please be prepared to pay the required co-pay, deductible or deposit on the day of service as defined by your plan.    Patient Education for Self-Quarantine Process    Following your COVID testing, we strongly recommend that you do not leave your home after you have been tested for COVID except to get medical care. This includes not going to work, school or to public areas.  If this is not possible for you to do please limit your activities to only required outings.  Be sure to wear a mask when you are with other people, practice social distancing and wash your hands frequently.      The following items provide additional details to keep you safe.  • Wash your hands with soap and water frequently for at least 20 seconds.   • Avoid touching your eyes, nose and mouth with unwashed  hands.  • Do not share anything - utensils, towels, food from the same bowl.   • Have your own utensils, drinking glass, dishes, towels and bedding.   • Do not have visitors.   • Do use FaceTime to stay in touch with family and friends.  • You should stay in a specific room away from others if possible.   • Stay at least 6 feet away from others in the home if you cannot have a dedicated room to yourself.   • Do not snuggle with your pet. While the CDC says there is no evidence that pets can spread COVID-19 or be infected from humans, it is probably best to avoid “petting, snuggling, being kissed or licked and sharing food (during self-quarantine)”, according to the CDC.   • Sanitize household surfaces daily. Include all high touch areas (door handles, light switches, phones, countertops, etc.)  • Do not share a bathroom with others, if possible.   • Wear a mask around others in your home if you are unable to stay in a separate room or 6 feet apart. If  you are unable to wear a mask, have your family member wear a mask if they must be within 6 feet of you.   Call your surgeon immediately if you experience any of the following symptoms:  • Sore Throat  • Shortness of Breath or difficulty breathing  • Cough  • Chills  • Body soreness or muscle pain  • Headache  • Fever  • New loss of taste or smell  • Do not arrive for your surgery ill.  Your procedure will need to be rescheduled to another time.  You will need to call your physician before the day of surgery to avoid any unnecessary exposure to hospital staff as well as other patients.

## 2021-03-20 ENCOUNTER — LAB (OUTPATIENT)
Dept: LAB | Facility: HOSPITAL | Age: 51
End: 2021-03-20

## 2021-03-20 DIAGNOSIS — Z01.818 OTHER SPECIFIED PRE-OPERATIVE EXAMINATION: ICD-10-CM

## 2021-03-20 PROCEDURE — U0004 COV-19 TEST NON-CDC HGH THRU: HCPCS

## 2021-03-20 PROCEDURE — C9803 HOPD COVID-19 SPEC COLLECT: HCPCS

## 2021-03-22 LAB — SARS-COV-2 RNA RESP QL NAA+PROBE: NOT DETECTED

## 2021-03-23 ENCOUNTER — HOSPITAL ENCOUNTER (OUTPATIENT)
Facility: HOSPITAL | Age: 51
Setting detail: HOSPITAL OUTPATIENT SURGERY
Discharge: HOME | End: 2021-03-23
Attending: ORTHOPAEDIC SURGERY | Admitting: ORTHOPAEDIC SURGERY

## 2021-03-23 ENCOUNTER — ANESTHESIA EVENT (OUTPATIENT)
Dept: PERIOP | Facility: HOSPITAL | Age: 51
End: 2021-03-23

## 2021-03-23 ENCOUNTER — ANESTHESIA (OUTPATIENT)
Dept: PERIOP | Facility: HOSPITAL | Age: 51
End: 2021-03-23

## 2021-03-23 VITALS
TEMPERATURE: 97.5 F | DIASTOLIC BLOOD PRESSURE: 64 MMHG | OXYGEN SATURATION: 96 % | HEART RATE: 82 BPM | RESPIRATION RATE: 16 BRPM | SYSTOLIC BLOOD PRESSURE: 114 MMHG

## 2021-03-23 DIAGNOSIS — G89.29 CHRONIC RIGHT SHOULDER PAIN: ICD-10-CM

## 2021-03-23 DIAGNOSIS — M25.511 CHRONIC RIGHT SHOULDER PAIN: ICD-10-CM

## 2021-03-23 LAB
ANION GAP SERPL CALCULATED.3IONS-SCNC: 11.3 MMOL/L (ref 5–15)
BUN SERPL-MCNC: 21 MG/DL (ref 6–20)
BUN/CREAT SERPL: 23.3 (ref 7–25)
CALCIUM SPEC-SCNC: 9.3 MG/DL (ref 8.6–10.5)
CHLORIDE SERPL-SCNC: 103 MMOL/L (ref 98–107)
CO2 SERPL-SCNC: 26.7 MMOL/L (ref 22–29)
CREAT SERPL-MCNC: 0.9 MG/DL (ref 0.57–1)
GFR SERPL CREATININE-BSD FRML MDRD: 66 ML/MIN/1.73
GLUCOSE SERPL-MCNC: 111 MG/DL (ref 65–99)
POTASSIUM SERPL-SCNC: 4 MMOL/L (ref 3.5–5.2)
SODIUM SERPL-SCNC: 141 MMOL/L (ref 136–145)

## 2021-03-23 PROCEDURE — 25010000002 PROPOFOL 10 MG/ML EMULSION: Performed by: NURSE ANESTHETIST, CERTIFIED REGISTERED

## 2021-03-23 PROCEDURE — 29826 SHO ARTHRS SRG DECOMPRESSION: CPT | Performed by: ORTHOPAEDIC SURGERY

## 2021-03-23 PROCEDURE — 25010000002 FENTANYL CITRATE (PF) 100 MCG/2ML SOLUTION: Performed by: ANESTHESIOLOGY

## 2021-03-23 PROCEDURE — C9290 INJ, BUPIVACAINE LIPOSOME: HCPCS | Performed by: ANESTHESIOLOGY

## 2021-03-23 PROCEDURE — 25010000002 DEXAMETHASONE PER 1 MG: Performed by: NURSE ANESTHETIST, CERTIFIED REGISTERED

## 2021-03-23 PROCEDURE — 25010000002 EPINEPHRINE PER 0.1 MG: Performed by: ORTHOPAEDIC SURGERY

## 2021-03-23 PROCEDURE — 25010000002 FENTANYL CITRATE (PF) 100 MCG/2ML SOLUTION: Performed by: NURSE ANESTHETIST, CERTIFIED REGISTERED

## 2021-03-23 PROCEDURE — 29826 SHO ARTHRS SRG DECOMPRESSION: CPT | Performed by: NURSE PRACTITIONER

## 2021-03-23 PROCEDURE — 80048 BASIC METABOLIC PNL TOTAL CA: CPT | Performed by: ANESTHESIOLOGY

## 2021-03-23 PROCEDURE — 29822 SHO ARTHRS SRG LMTD DBRDMT: CPT | Performed by: ORTHOPAEDIC SURGERY

## 2021-03-23 PROCEDURE — 29822 SHO ARTHRS SRG LMTD DBRDMT: CPT | Performed by: NURSE PRACTITIONER

## 2021-03-23 PROCEDURE — 76942 ECHO GUIDE FOR BIOPSY: CPT | Performed by: ORTHOPAEDIC SURGERY

## 2021-03-23 PROCEDURE — 25010000002 MIDAZOLAM PER 1 MG: Performed by: ANESTHESIOLOGY

## 2021-03-23 PROCEDURE — 25010000003 CEFAZOLIN IN DEXTROSE 2-4 GM/100ML-% SOLUTION: Performed by: ORTHOPAEDIC SURGERY

## 2021-03-23 PROCEDURE — 25010000003 BUPIVACAINE LIPOSOME 1.3 % SUSPENSION: Performed by: ANESTHESIOLOGY

## 2021-03-23 PROCEDURE — 25010000002 ONDANSETRON PER 1 MG: Performed by: NURSE ANESTHETIST, CERTIFIED REGISTERED

## 2021-03-23 PROCEDURE — 25010000002 PHENYLEPHRINE PER 1 ML: Performed by: NURSE ANESTHETIST, CERTIFIED REGISTERED

## 2021-03-23 RX ORDER — SODIUM CHLORIDE 0.9 % (FLUSH) 0.9 %
3-10 SYRINGE (ML) INJECTION AS NEEDED
Status: DISCONTINUED | OUTPATIENT
Start: 2021-03-23 | End: 2021-03-23 | Stop reason: HOSPADM

## 2021-03-23 RX ORDER — DIPHENHYDRAMINE HCL 25 MG
25 CAPSULE ORAL
Status: DISCONTINUED | OUTPATIENT
Start: 2021-03-23 | End: 2021-03-23 | Stop reason: HOSPADM

## 2021-03-23 RX ORDER — SODIUM CHLORIDE 0.9 % (FLUSH) 0.9 %
3 SYRINGE (ML) INJECTION EVERY 12 HOURS SCHEDULED
Status: DISCONTINUED | OUTPATIENT
Start: 2021-03-23 | End: 2021-03-23 | Stop reason: HOSPADM

## 2021-03-23 RX ORDER — CEFAZOLIN SODIUM 2 G/100ML
2 INJECTION, SOLUTION INTRAVENOUS ONCE
Status: COMPLETED | OUTPATIENT
Start: 2021-03-23 | End: 2021-03-23

## 2021-03-23 RX ORDER — ROCURONIUM BROMIDE 10 MG/ML
INJECTION, SOLUTION INTRAVENOUS AS NEEDED
Status: DISCONTINUED | OUTPATIENT
Start: 2021-03-23 | End: 2021-03-23 | Stop reason: SURG

## 2021-03-23 RX ORDER — SODIUM CHLORIDE, SODIUM LACTATE, POTASSIUM CHLORIDE, AND CALCIUM CHLORIDE .6; .31; .03; .02 G/100ML; G/100ML; G/100ML; G/100ML
INJECTION, SOLUTION INTRAVENOUS AS NEEDED
Status: DISCONTINUED | OUTPATIENT
Start: 2021-03-23 | End: 2021-03-23 | Stop reason: HOSPADM

## 2021-03-23 RX ORDER — FENTANYL CITRATE 50 UG/ML
INJECTION, SOLUTION INTRAMUSCULAR; INTRAVENOUS AS NEEDED
Status: DISCONTINUED | OUTPATIENT
Start: 2021-03-23 | End: 2021-03-23 | Stop reason: SURG

## 2021-03-23 RX ORDER — SODIUM CHLORIDE, SODIUM LACTATE, POTASSIUM CHLORIDE, CALCIUM CHLORIDE 600; 310; 30; 20 MG/100ML; MG/100ML; MG/100ML; MG/100ML
9 INJECTION, SOLUTION INTRAVENOUS CONTINUOUS
Status: DISCONTINUED | OUTPATIENT
Start: 2021-03-23 | End: 2021-03-23 | Stop reason: HOSPADM

## 2021-03-23 RX ORDER — HYDRALAZINE HYDROCHLORIDE 20 MG/ML
5 INJECTION INTRAMUSCULAR; INTRAVENOUS
Status: DISCONTINUED | OUTPATIENT
Start: 2021-03-23 | End: 2021-03-23 | Stop reason: HOSPADM

## 2021-03-23 RX ORDER — FAMOTIDINE 10 MG/ML
20 INJECTION, SOLUTION INTRAVENOUS ONCE
Status: DISCONTINUED | OUTPATIENT
Start: 2021-03-23 | End: 2021-03-23

## 2021-03-23 RX ORDER — FAMOTIDINE 10 MG/ML
20 INJECTION, SOLUTION INTRAVENOUS ONCE
Status: COMPLETED | OUTPATIENT
Start: 2021-03-23 | End: 2021-03-23

## 2021-03-23 RX ORDER — MIDAZOLAM HYDROCHLORIDE 1 MG/ML
1 INJECTION INTRAMUSCULAR; INTRAVENOUS
Status: DISCONTINUED | OUTPATIENT
Start: 2021-03-23 | End: 2021-03-23 | Stop reason: HOSPADM

## 2021-03-23 RX ORDER — FENTANYL CITRATE 50 UG/ML
50 INJECTION, SOLUTION INTRAMUSCULAR; INTRAVENOUS
Status: DISCONTINUED | OUTPATIENT
Start: 2021-03-23 | End: 2021-03-23

## 2021-03-23 RX ORDER — MIDAZOLAM HYDROCHLORIDE 1 MG/ML
2 INJECTION INTRAMUSCULAR; INTRAVENOUS
Status: DISCONTINUED | OUTPATIENT
Start: 2021-03-23 | End: 2021-03-23

## 2021-03-23 RX ORDER — LABETALOL HYDROCHLORIDE 5 MG/ML
5 INJECTION, SOLUTION INTRAVENOUS
Status: DISCONTINUED | OUTPATIENT
Start: 2021-03-23 | End: 2021-03-23 | Stop reason: HOSPADM

## 2021-03-23 RX ORDER — PROPOFOL 10 MG/ML
VIAL (ML) INTRAVENOUS AS NEEDED
Status: DISCONTINUED | OUTPATIENT
Start: 2021-03-23 | End: 2021-03-23 | Stop reason: SURG

## 2021-03-23 RX ORDER — FLUMAZENIL 0.1 MG/ML
0.2 INJECTION INTRAVENOUS AS NEEDED
Status: DISCONTINUED | OUTPATIENT
Start: 2021-03-23 | End: 2021-03-23 | Stop reason: HOSPADM

## 2021-03-23 RX ORDER — BUPIVACAINE HYDROCHLORIDE 5 MG/ML
INJECTION, SOLUTION EPIDURAL; INTRACAUDAL
Status: COMPLETED | OUTPATIENT
Start: 2021-03-23 | End: 2021-03-23

## 2021-03-23 RX ORDER — OXYCODONE AND ACETAMINOPHEN 7.5; 325 MG/1; MG/1
1 TABLET ORAL ONCE AS NEEDED
Status: DISCONTINUED | OUTPATIENT
Start: 2021-03-23 | End: 2021-03-23 | Stop reason: HOSPADM

## 2021-03-23 RX ORDER — ONDANSETRON 2 MG/ML
INJECTION INTRAMUSCULAR; INTRAVENOUS AS NEEDED
Status: DISCONTINUED | OUTPATIENT
Start: 2021-03-23 | End: 2021-03-23 | Stop reason: SURG

## 2021-03-23 RX ORDER — MIDAZOLAM HYDROCHLORIDE 1 MG/ML
2 INJECTION INTRAMUSCULAR; INTRAVENOUS
Status: DISCONTINUED | OUTPATIENT
Start: 2021-03-23 | End: 2021-03-23 | Stop reason: HOSPADM

## 2021-03-23 RX ORDER — MIDAZOLAM HYDROCHLORIDE 1 MG/ML
1 INJECTION INTRAMUSCULAR; INTRAVENOUS
Status: DISCONTINUED | OUTPATIENT
Start: 2021-03-23 | End: 2021-03-23

## 2021-03-23 RX ORDER — HYDROCODONE BITARTRATE AND ACETAMINOPHEN 7.5; 325 MG/1; MG/1
1 TABLET ORAL ONCE AS NEEDED
Status: COMPLETED | OUTPATIENT
Start: 2021-03-23 | End: 2021-03-23

## 2021-03-23 RX ORDER — LIDOCAINE HYDROCHLORIDE 10 MG/ML
0.5 INJECTION, SOLUTION EPIDURAL; INFILTRATION; INTRACAUDAL; PERINEURAL ONCE AS NEEDED
Status: DISCONTINUED | OUTPATIENT
Start: 2021-03-23 | End: 2021-03-23 | Stop reason: HOSPADM

## 2021-03-23 RX ORDER — EPHEDRINE SULFATE 50 MG/ML
5 INJECTION, SOLUTION INTRAVENOUS ONCE AS NEEDED
Status: DISCONTINUED | OUTPATIENT
Start: 2021-03-23 | End: 2021-03-23 | Stop reason: HOSPADM

## 2021-03-23 RX ORDER — DOCUSATE SODIUM 100 MG/1
100 CAPSULE, LIQUID FILLED ORAL 2 TIMES DAILY
Qty: 60 CAPSULE | Refills: 0 | Status: SHIPPED | OUTPATIENT
Start: 2021-03-23 | End: 2021-06-07

## 2021-03-23 RX ORDER — FENTANYL CITRATE 50 UG/ML
50 INJECTION, SOLUTION INTRAMUSCULAR; INTRAVENOUS
Status: DISCONTINUED | OUTPATIENT
Start: 2021-03-23 | End: 2021-03-23 | Stop reason: HOSPADM

## 2021-03-23 RX ORDER — DIPHENHYDRAMINE HYDROCHLORIDE 50 MG/ML
12.5 INJECTION INTRAMUSCULAR; INTRAVENOUS
Status: DISCONTINUED | OUTPATIENT
Start: 2021-03-23 | End: 2021-03-23 | Stop reason: HOSPADM

## 2021-03-23 RX ORDER — HYDROMORPHONE HYDROCHLORIDE 1 MG/ML
0.5 INJECTION, SOLUTION INTRAMUSCULAR; INTRAVENOUS; SUBCUTANEOUS
Status: DISCONTINUED | OUTPATIENT
Start: 2021-03-23 | End: 2021-03-23 | Stop reason: HOSPADM

## 2021-03-23 RX ORDER — PROMETHAZINE HYDROCHLORIDE 25 MG/1
25 SUPPOSITORY RECTAL ONCE AS NEEDED
Status: DISCONTINUED | OUTPATIENT
Start: 2021-03-23 | End: 2021-03-23 | Stop reason: HOSPADM

## 2021-03-23 RX ORDER — NALOXONE HCL 0.4 MG/ML
0.2 VIAL (ML) INJECTION AS NEEDED
Status: DISCONTINUED | OUTPATIENT
Start: 2021-03-23 | End: 2021-03-23 | Stop reason: HOSPADM

## 2021-03-23 RX ORDER — ONDANSETRON 4 MG/1
4 TABLET, FILM COATED ORAL EVERY 8 HOURS PRN
Qty: 30 TABLET | Refills: 0 | Status: SHIPPED | OUTPATIENT
Start: 2021-03-23 | End: 2021-06-07

## 2021-03-23 RX ORDER — PROMETHAZINE HYDROCHLORIDE 25 MG/1
25 TABLET ORAL ONCE AS NEEDED
Status: DISCONTINUED | OUTPATIENT
Start: 2021-03-23 | End: 2021-03-23 | Stop reason: HOSPADM

## 2021-03-23 RX ORDER — DEXAMETHASONE SODIUM PHOSPHATE 10 MG/ML
INJECTION INTRAMUSCULAR; INTRAVENOUS AS NEEDED
Status: DISCONTINUED | OUTPATIENT
Start: 2021-03-23 | End: 2021-03-23 | Stop reason: SURG

## 2021-03-23 RX ORDER — LIDOCAINE HYDROCHLORIDE 10 MG/ML
0.5 INJECTION, SOLUTION EPIDURAL; INFILTRATION; INTRACAUDAL; PERINEURAL ONCE AS NEEDED
Status: COMPLETED | OUTPATIENT
Start: 2021-03-23 | End: 2021-03-23

## 2021-03-23 RX ORDER — LIDOCAINE HYDROCHLORIDE 20 MG/ML
INJECTION, SOLUTION INFILTRATION; PERINEURAL AS NEEDED
Status: DISCONTINUED | OUTPATIENT
Start: 2021-03-23 | End: 2021-03-23 | Stop reason: SURG

## 2021-03-23 RX ORDER — HYDROCODONE BITARTRATE AND ACETAMINOPHEN 7.5; 325 MG/1; MG/1
1-2 TABLET ORAL EVERY 4 HOURS PRN
Qty: 42 TABLET | Refills: 0 | Status: SHIPPED | OUTPATIENT
Start: 2021-03-23 | End: 2021-04-26

## 2021-03-23 RX ORDER — ONDANSETRON 2 MG/ML
4 INJECTION INTRAMUSCULAR; INTRAVENOUS ONCE AS NEEDED
Status: DISCONTINUED | OUTPATIENT
Start: 2021-03-23 | End: 2021-03-23 | Stop reason: HOSPADM

## 2021-03-23 RX ADMIN — ROCURONIUM BROMIDE 50 MG: 50 INJECTION INTRAVENOUS at 08:08

## 2021-03-23 RX ADMIN — FENTANYL CITRATE 50 MCG: 50 INJECTION INTRAMUSCULAR; INTRAVENOUS at 08:35

## 2021-03-23 RX ADMIN — BUPIVACAINE 10 ML: 13.3 INJECTION, SUSPENSION, LIPOSOMAL INFILTRATION at 07:55

## 2021-03-23 RX ADMIN — PHENYLEPHRINE HYDROCHLORIDE 100 MCG: 10 INJECTION INTRAVENOUS at 08:30

## 2021-03-23 RX ADMIN — LIDOCAINE HYDROCHLORIDE 0.5 ML: 10 INJECTION, SOLUTION EPIDURAL; INFILTRATION; INTRACAUDAL; PERINEURAL at 07:20

## 2021-03-23 RX ADMIN — CEFAZOLIN SODIUM 2 G: 2 INJECTION, SOLUTION INTRAVENOUS at 08:22

## 2021-03-23 RX ADMIN — LIDOCAINE HYDROCHLORIDE 100 MG: 20 INJECTION, SOLUTION INFILTRATION; PERINEURAL at 08:08

## 2021-03-23 RX ADMIN — HYDROCODONE BITARTRATE AND ACETAMINOPHEN 1 TABLET: 7.5; 325 TABLET ORAL at 10:18

## 2021-03-23 RX ADMIN — FENTANYL CITRATE 25 MCG: 50 INJECTION, SOLUTION INTRAMUSCULAR; INTRAVENOUS at 07:42

## 2021-03-23 RX ADMIN — PROPOFOL 200 MG: 10 INJECTION, EMULSION INTRAVENOUS at 08:08

## 2021-03-23 RX ADMIN — ONDANSETRON 4 MG: 2 INJECTION INTRAMUSCULAR; INTRAVENOUS at 08:50

## 2021-03-23 RX ADMIN — SUGAMMADEX 200 MG: 100 INJECTION, SOLUTION INTRAVENOUS at 09:05

## 2021-03-23 RX ADMIN — PHENYLEPHRINE HYDROCHLORIDE 100 MCG: 10 INJECTION INTRAVENOUS at 08:08

## 2021-03-23 RX ADMIN — MIDAZOLAM 0.5 MG: 1 INJECTION INTRAMUSCULAR; INTRAVENOUS at 07:42

## 2021-03-23 RX ADMIN — FAMOTIDINE 20 MG: 10 INJECTION INTRAVENOUS at 07:27

## 2021-03-23 RX ADMIN — SODIUM CHLORIDE, POTASSIUM CHLORIDE, SODIUM LACTATE AND CALCIUM CHLORIDE 9 ML/HR: 600; 310; 30; 20 INJECTION, SOLUTION INTRAVENOUS at 07:20

## 2021-03-23 RX ADMIN — PHENYLEPHRINE HYDROCHLORIDE 100 MCG: 10 INJECTION INTRAVENOUS at 08:18

## 2021-03-23 RX ADMIN — PHENYLEPHRINE HYDROCHLORIDE 100 MCG: 10 INJECTION INTRAVENOUS at 08:59

## 2021-03-23 RX ADMIN — PHENYLEPHRINE HYDROCHLORIDE 100 MCG: 10 INJECTION INTRAVENOUS at 08:48

## 2021-03-23 RX ADMIN — DEXAMETHASONE SODIUM PHOSPHATE 8 MG: 10 INJECTION INTRAMUSCULAR; INTRAVENOUS at 08:46

## 2021-03-23 RX ADMIN — BUPIVACAINE HYDROCHLORIDE 5 ML: 5 INJECTION, SOLUTION EPIDURAL; INTRACAUDAL; PERINEURAL at 07:55

## 2021-03-23 NOTE — ANESTHESIA POSTPROCEDURE EVALUATION
Patient: Ashely Moore    Procedure Summary     Date: 03/23/21 Room / Location:  [facility]  /  [facility]    Anesthesia Start: 0800 Anesthesia Stop: 0924    Procedure: Shoulder arthroscopy with subacromial decompression, biceps tenotomy (Right Shoulder) Diagnosis:       Chronic right shoulder pain      (Chronic right shoulder pain [M25.511, G89.29])    Surgeons: Gildadro Koenig MD Provider: Abeba Wilburn MD    Anesthesia Type: general with block ASA Status: 4          Anesthesia Type: general with block    Vitals  Vitals Value Taken Time   /40 03/23/21 1045   Temp 36.4 °C (97.5 °F) 03/23/21 1030   Pulse 84 03/23/21 1039   Resp 14 03/23/21 1030   SpO2 87 % 03/23/21 1046   Vitals shown include unvalidated device data.        Post Anesthesia Care and Evaluation    Patient location during evaluation: PHASE II  Patient participation: complete - patient participated  Level of consciousness: awake  Pain management: adequate  Airway patency: patent  Anesthetic complications: No anesthetic complications    Cardiovascular status: acceptable  Respiratory status: acceptable  Hydration status: acceptable    Comments: /72 (BP Location: Left arm, Patient Position: Lying)   Pulse 82   Temp 36.4 °C (97.5 °F) (Temporal)   Resp 14   SpO2 93%

## 2021-03-23 NOTE — ANESTHESIA PROCEDURE NOTES
Interscalene Block      Patient reassessed immediately prior to procedure    Start time: 3/23/2021 7:46 AM  Stop time: 3/23/2021 7:52 AM  Reason for block: at surgeon's request and post-op pain management  Performed by  Anesthesiologist: Abeba Wilburn MD  Preanesthetic Checklist  Completed: patient identified, IV checked, site marked, risks and benefits discussed, surgical consent, monitors and equipment checked, pre-op evaluation and timeout performed  Prep:  Pt Position: sitting  Sterile barriers:cap, gloves and mask  Prep: ChloraPrep  Patient monitoring: blood pressure monitoring, continuous pulse oximetry and EKG  Procedure  Sedation:yes  Performed under: local infiltration  Guidance:ultrasound guided and nerve stimulator  ULTRASOUND INTERPRETATION.  Using ultrasound guidance a 22 G gauge needle was placed in close proximity to the brachial plexus nerve, at which point, under ultrasound guidance anesthetic was injected in the area of the nerve and spread of the anesthesia was seen on ultrasound in close proximity thereto.  There were no abnormalities seen on ultrasound; a digital image was taken; and the patient tolerated the procedure with no complications. Images:still images not obtained    Laterality:right  Block Type:interscalene  Injection Technique:single-shot  Needle Type:short-bevel and echogenic  Needle Gauge:22 G  Resistance on Injection: none    Medications Used: bupivacaine liposome (EXPAREL) 1.3 % injection, 10 mL  bupivacaine (PF) (MARCAINE) 0.5 % injection, 5 mL  Med admintered at 3/23/2021 7:55 AM      Post Assessment  Injection Assessment: negative aspiration for heme, no paresthesia on injection and incremental injection  Patient Tolerance:comfortable throughout block  Complications:no

## 2021-03-23 NOTE — ANESTHESIA PROCEDURE NOTES
Airway  Urgency: elective    Date/Time: 3/23/2021 8:11 AM  Airway not difficult    General Information and Staff    Patient location during procedure: OR  Anesthesiologist: Abeba Wilburn MD  CRNA: Carly Glaser CRNA    Indications and Patient Condition  Indications for airway management: airway protection    Preoxygenated: yes  MILS maintained throughout  Mask difficulty assessment: 1 - vent by mask    Final Airway Details  Final airway type: endotracheal airway      Successful airway: ETT  Cuffed: yes   Successful intubation technique: direct laryngoscopy  Endotracheal tube insertion site: oral  Blade: CMAC  Blade size: D  ETT size (mm): 7.0  Cormack-Lehane Classification: grade I - full view of glottis  Placement verified by: chest auscultation, bronchoscopy and capnometry   Measured from: teeth  ETT/EBT  to teeth (cm): 22  Number of attempts at approach: 1  Assessment: lips, teeth, and gum same as pre-op and atraumatic intubation

## 2021-03-23 NOTE — ANESTHESIA PREPROCEDURE EVALUATION
Anesthesia Evaluation     Patient summary reviewed and Nursing notes reviewed   history of anesthetic complications (SOB from PNB during manip, abdominal pain ):  NPO Solid Status: > 8 hours  NPO Liquid Status: > 2 hours           Airway   Mallampati: III  TM distance: >3 FB  Neck ROM: full  Difficult intubation highly probable  Dental - normal exam     Pulmonary - normal exam   (+) sleep apnea,   Cardiovascular - normal exam    ECG reviewed  Patient on routine beta blocker    (+) pacemaker (medtronic, magnet safe, magent mode is DOO at 85 bpm) ICD, hypertension, CHF (3 weeks ago was hospitalized for CHF exacerbation and fluid overload. Was diuresed and home meds titrated up (lasix), patient doing much better. However, her EF is 24%) Systolic <55%,       Neuro/Psych  (+) psychiatric history Anxiety,     GI/Hepatic/Renal/Endo    (+) morbid obesity,      Musculoskeletal     Abdominal   (+) obese,    Substance History      OB/GYN          Other   arthritis,      ROS/Med Hx Other: Nonischemic dilated cardiomyopathy    Tachy hx s/p ablation    ECHO per cardiology note, Dr. Parth Bullock: LVEF 24% RVSP 40 mm                  Anesthesia Plan    ASA 4     general with block   (Risks of peripheral nerve block were discussed with patient including but not limited to: inadequate block, bleeding, infection, persistent numbness or weakness, nerve damage, painful dysasthesia and need to protect limb while numb.    I have reviewed the patient's history with the patient and the chart, including all pertinent laboratory results and imaging. I have explained the risks of anesthesia including but not limited to dental damage, corneal abrasion, nerve injury, MI, stroke, and death.  )  intravenous induction     Anesthetic plan, all risks, benefits, and alternatives have been provided, discussed and informed consent has been obtained with: patient.    Plan discussed with CRNA.

## 2021-03-24 ENCOUNTER — TELEPHONE (OUTPATIENT)
Dept: ORTHOPEDIC SURGERY | Facility: CLINIC | Age: 51
End: 2021-03-24

## 2021-03-31 ENCOUNTER — OFFICE VISIT (OUTPATIENT)
Dept: ORTHOPEDIC SURGERY | Facility: CLINIC | Age: 51
End: 2021-03-31

## 2021-03-31 VITALS — WEIGHT: 269 LBS | HEIGHT: 66 IN | TEMPERATURE: 98 F | BODY MASS INDEX: 43.23 KG/M2

## 2021-03-31 DIAGNOSIS — Z98.890 S/P ARTHROSCOPY OF RIGHT SHOULDER: Primary | ICD-10-CM

## 2021-03-31 PROCEDURE — 99024 POSTOP FOLLOW-UP VISIT: CPT | Performed by: ORTHOPAEDIC SURGERY

## 2021-03-31 RX ORDER — HYDROCODONE BITARTRATE AND ACETAMINOPHEN 7.5; 325 MG/1; MG/1
1 TABLET ORAL EVERY 4 HOURS PRN
Qty: 50 TABLET | Refills: 0 | Status: SHIPPED | OUTPATIENT
Start: 2021-03-31 | End: 2021-04-16 | Stop reason: SDUPTHER

## 2021-03-31 NOTE — PROGRESS NOTES
Ashely Moore : 1970 MRN: [id number] DATE: 3/31/2021    CC: 1 week s/p right shoulder arthroscopic debridement, biceps tenotomy and synovectomy    HPI: Patient returns to clinic today for follow up.  Reports pain is well controlled.  Denies fevers, drainage, redness or other concerning symptoms.      Vitals:    21 1125   Temp: 98 °F (36.7 °C)       Exam:  Wounds appear well-approximated.  Arm and forearm soft.  Shoulder moves fluidly with pendulums.  Good motor and sensory function in the hand and wrist.  Palpable radial pulse with brisk capillary refill     Impression:  1 week s/p right shoulder arthroscopic debridement, biceps tenotomy and synovectomy    Plan:    1.  Begin PT per protocol--we need to get her aggressively working on range of motion as she is high risk for adhesive capsulitis  2.  Appropriate activity modifications and restrictions discussed  3.  Follow-up in 4 weeks  4.  I did agree to refill her pain medicine.  Risks were discussed.    Gildardo Koenig MD

## 2021-04-16 ENCOUNTER — TELEPHONE (OUTPATIENT)
Dept: ORTHOPEDIC SURGERY | Facility: CLINIC | Age: 51
End: 2021-04-16

## 2021-04-16 DIAGNOSIS — Z98.890 S/P ARTHROSCOPY OF RIGHT SHOULDER: ICD-10-CM

## 2021-04-16 RX ORDER — HYDROCODONE BITARTRATE AND ACETAMINOPHEN 7.5; 325 MG/1; MG/1
1 TABLET ORAL EVERY 4 HOURS PRN
Qty: 50 TABLET | Refills: 0 | Status: SHIPPED | OUTPATIENT
Start: 2021-04-16 | End: 2021-06-07

## 2021-04-16 NOTE — TELEPHONE ENCOUNTER
Caller: MARY OLIVEIRA    Relationship: SELF     Best call back number:[phone]    Medication needed: HYDROcodone-acetaminophen (NORCO) 7.5-325 MG per tablet     When do you need the refill by: 04/18/2021      Does the patient have less than a 3 day supply:  [x] Yes  [] No    What is the patient's preferred pharmacy:   [facility] IN [place] -[facility] [address]

## 2021-04-16 NOTE — TELEPHONE ENCOUNTER
Caller: MARY OLIVEIRA     Relationship: SELF    Best call back number: [phone]    What form or medical record are you requesting: OPERATIVE REPORT FROM [facility] 03/23/2021 WITH      Who is requesting this form or medical record from you: [facility] [place]     How would you like to receive the form or medical records (pick-up, mail, fax): FAX     Timeframe paperwork needed: ASAP     Additional notes: PT STATED THAT  WAS GOING TO FAX OPERATIVE REPORT TO THE [facility]OFF[facility] PLEASE ADVISE PT IS UNSURE OF FAX NUMBER

## 2021-04-26 ENCOUNTER — OFFICE VISIT (OUTPATIENT)
Dept: ORTHOPEDIC SURGERY | Facility: CLINIC | Age: 51
End: 2021-04-26

## 2021-04-26 VITALS — TEMPERATURE: 97.8 F | HEIGHT: 66 IN | BODY MASS INDEX: 43.23 KG/M2 | WEIGHT: 269 LBS

## 2021-04-26 DIAGNOSIS — Z09 SURGERY FOLLOW-UP: Primary | ICD-10-CM

## 2021-04-26 PROCEDURE — 99024 POSTOP FOLLOW-UP VISIT: CPT | Performed by: NURSE PRACTITIONER

## 2021-04-26 NOTE — PROGRESS NOTES
Ashely Moore : 1970 MRN: [id number] DATE: 2021    CC: 1 month s/p right shoulder arthroscopic debridement, biceps tenotomy and synovectomy    HPI: Patient returns to clinic today for follow up.  Reports pain is much better.  Reports good progress with therapy.      Vitals:    21 1406   Temp: 97.8 °F (36.6 °C)       Exam:  Wounds appear healed.  Shoulder moves fluidly and her motion is on track.  Good motor and sensory function in the hand and wrist.  Palpable radial pulse with brisk capillary refill.    Impression:  1 month s/p right shoulder arthroscopic debridement, biceps tenotomy and synovectomy    Plan:    1.  Continue PT per protocol--encouraged patient to progress motion as tolerated and demonstrated home exercise program.  2.  Follow up in 6 weeks with Dr. Koenig.      Alicia Zimmerman, APRN     2021

## 2021-05-18 NOTE — PROGRESS NOTES
Ashely Moore 1970     Office/Outpatient Visit    Visit Date: Tue, Aug 28, 2018 12:42 pm    Provider: Sergio Ribeiro MD (Assistant: Lynn Gr MA)    Location: [facility]        Electronically signed by Sergio Ribeiro MD on  08/28/2018 08:08:49 PM                             SUBJECTIVE:        CC:     Ms. Moore is a 47 year old White female.  This is a follow-up visit.          HPI: Ms. Moore presents today with back pain.  She as a h/o kidney stone 20 years ago.  3 weeks ago at work lifting and bending over,  back started to hurt, got worse over the next 3 days, went to ambulatory care as advised by HR.  They found blood in her urine, culture was negative.  Was told to come back if still hurting.  Was given muscle relaxer, steroid, and antibiotic.  Muscle relaxer didn't help.  Stopped taking Abx when culture came back negative.  Finished steroid course.  Was sent to [place] to be eval for stone, did CT, didn't find anything.  Had an appt with cardiologist 8/14.   was concerned the Entresto was bad for kidneys.        She describes the pain as stiff, hard to straighten back up, catches her, maybe achey.  Pain is worse with bending over or picking something up.  Even when she tries to squat rather than bend over, feels pain upon standing up.  Pain is about a 4-5/10.  No pain at rest.  Pain is mid low back and sometimes radiates around to her left side.  This does not feel like her kidney stone 20 years ago.  2 extra strength Tylenol have helped.  Hasn't been working since she went to ambulatory care, so hasn't needed to take the Tylenol.        Pt denies bianka blood in urine, new onset polyuria, dysuria.  Acknowledges her diuretic causes her to urinate a lot during the day.        Saw cardiologist 8/14/18.  He is recommending she go on disability.  Not able to work due to the heat.  Gets SOA at work.         Patient complains of low back pain.  The discomfort is most prominent  in the lumbar spine.  This radiates to the buttocks.  She characterizes it as intermittent, moderate in intensity, and dull.  This is an acute episode with no prior history of back pain.  She states that the current episode of pain started 3 weeks ago.  The event which precipitated this pain was job-related repetitive lifting.  She denies numbness in the legs and weakness of the legs.  Other details: WENT TO [place], POS RBC IN U/A.  NEG CT FOR KIDNEY STONE..      ROS:     CONSTITUTIONAL:  Negative for chills and fever.      CARDIOVASCULAR:  Negative for chest pain, dizziness, orthopnea, palpitations, pedal edema and tachycardia.      RESPIRATORY:  Positive for dyspnea ( when walking quickly for a long distance, while at work ).   Negative for recent cough or frequent wheezing.      GASTROINTESTINAL:  Negative for abdominal pain, constipation, diarrhea, nausea and vomiting.      GENITOURINARY:  Positive for polyuria (attributes to diuretic).   Negative for dysuria, hematuria or nocturia.      INTEGUMENTARY/BREAST:  Negative for rash.      NEUROLOGICAL:  Positive for paresthesia ( bilateral lower extremity; when sitting in a chair for a long time ).   Negative for headaches.          PMH/FMH/SH:     Last Reviewed on 2018 12:58 PM by Sergio Ribeiro    Past Medical History:                 PAST MEDICAL HISTORY             GYNECOLOGICAL HISTORY:     miscarriage ONE    Last mammogram was          CURRENT MEDICAL PROVIDERS:    Cardiologist: [facility]         Surgical History:         Dilation and Curettage    DEFIBRILLATOR PLACED;         Family History:         Positive for Coronary Artery Disease and Cardiomyopathy--brother (Jocelyn Lopez) S/P transplant.      Positive for Parkinson's Disease.  Father:  at age 54; Cause of death was cardiomyopathy     Mother: Anxiety     Brother(s): 3 brother(s) total; 1 ;  cardiomyopathy     Sister(s): 4 sister(s) total         Social History:     Occupation:  [employer]     Marital Status:      Children: None         Tobacco/Alcohol/Supplements:     Last Reviewed on 8/28/2018 12:48 PM by Lynn Gr    Tobacco: She has a past history of cigarette smoking; quit date:  march 2016.  Non-drinker     Caffeine:  She admits to consuming caffeine via -LITTLE.              Current Problems:     Last Reviewed on 8/28/2018 12:59 PM by Sergio Ribeiro    Low back pain     Anxiety     Patient with automatic implantable cardiac defibrillator     Systolic heart failure, chronic         Immunizations:     None        Allergies:     Last Reviewed on 8/28/2018 12:57 PM by Sergio Ribeiro      No Known Drug Allergies.         Current Medications:     Last Reviewed on 8/28/2018 12:48 PM by Lynn Gr    Zoloft 50mg Tablet 1 a day     Entresto 49mg/51mg Tablet Take 1 tablet(s) by mouth bid     81mg asa q day     Potassium Chloride 20mEq Tablets, Extended Release 1 tabs QOD     Spironolactone 25mg Tablet Take 1/2 tab daily     Carvedilol 25mg Tablet 1 po bid     Furosemide 40mg Tablets 1 by mouth  daily         OBJECTIVE:        Vitals:         Current: 8/28/2018 12:47:37 PM    Ht:  5 ft, 4 in;  Wt: 226.2 lbs;  BMI: 38.8    T: 98.5 F (oral);  BP: 104/55 mm Hg (left arm, sitting);  P: 111 bpm (left arm (BP Cuff), sitting);  sCr: 0.7 mg/dL;  GFR: 115.80        Exams:     PHYSICAL EXAM:     GENERAL: vital signs recorded - well developed, well nourished;  no apparent distress;     NEUROLOGIC: GROSSLY INTACT     PSYCHIATRIC:  appropriate affect and demeanor; normal speech pattern; grossly normal memory; LOW BACK examination: Inspection: normal;     Palpation: lumbar tenderness;     Muscular Strength: normal;     Range of Motion: LAROM;     Maneuvers: (-) bilateral straight leg raise.              ASSESSMENT           724.2   M54.5  Low back pain              DDx:     599.72   R31.29  Microscopic hematuria              DDx:         ORDERS:         Meds  Prescribed:       Cyclobenzaprine HCl 10mg Tablet 1 tablet AT HS PRN  #15 (Fifteen) tablet(s) Refills: 0       Meloxicam 15mg Tablet Take 1 tablet(s) by mouth daily  #15 (Fifteen) tablet(s) Refills: 0         Radiology/Test Orders:       [id number]  Radiologic examination, spine, lumbosacral;  minimum of four views  (Send-Out)           Lab Orders:       [id number]  [facility] Urinalysis, automated, with micro  (Send-Out)           Procedures Ordered:       REFER  Referral to Specialist or Other Facility  (Send-Out)                   PLAN:          Low back pain         RADIOLOGY:  I have ordered Lumbar/Sacral Spine X-ray to be done today.      REFERRALS:  Referral initiated to physical therapy ( [facility] ).      FOLLOW-UP: Schedule follow-up appointments on a p.r.n. basis..      MEDICATIONS: I have prescribed an NSAID and a muscle relaxant.            Prescriptions:       Cyclobenzaprine HCl 10mg Tablet 1 tablet AT HS PRN  #15 (Fifteen) tablet(s) Refills: 0       Meloxicam 15mg Tablet Take 1 tablet(s) by mouth daily  #15 (Fifteen) tablet(s) Refills: 0           Orders:       [id number]  Radiologic examination, spine, lumbosacral;  minimum of four views  (Send-Out)         REFER  Referral to Specialist or Other Facility  (Send-Out)            Microscopic hematuria     LABORATORY:  Labs ordered to be performed today include urinalysis with micro.  CONSIDER FURTHER W/U.            Orders:       [id number]  [facility] Urinalysis, automated, with micro  (Send-Out)               Patient Recommendations:        For  Low back pain:     Schedule follow-up appointments as needed.                APPOINTMENT INFORMATION:        Monday Tuesday Wednesday Thursday Friday Saturday Sunday            Time:___________________AM  PM   Date:_____________________             CHARGE CAPTURE           **Please note: ICD descriptions below are intended for billing purposes only and may not represent clinical diagnoses**         Primary Diagnosis:         724.2 Low back pain            M54.5    Low back pain              Orders:          [id number]   Office/outpatient visit; established patient, level 4  (In-House)           599.72 Microscopic hematuria            R31.29    Other microscopic hematuria

## 2021-05-18 NOTE — PROGRESS NOTES
Ashely Moore 1970     Office/Outpatient Visit    Visit Date: Tue, Aug 6, 2019 03:49 pm    Provider: Gisel Gr N.P. (Assistant: Kathy Rico MA)    Location: [facility]        Electronically signed by Gisel Gr N.P. on  2019 05:41:32 PM                             SUBJECTIVE:        CC: (TAKING POTASSIUM EVERY DAY)     Ms. Moore is a 48 year old White female.  This is a follow-up visit.  check up.;         HPI:         Ms. Moore presents with anxiety.  Current treatment includes Zoloft.  Doing well on zoloft, wants to continue.          PHQ-9 Depression Screening: Completed form scanned and in chart; Total Score 3 Alcohol Consumption Screening: Completed form scanned and in chart; Total Score 0     ROS:     CONSTITUTIONAL:  Negative for chills, fatigue, fever, and weight change.      CARDIOVASCULAR:  Negative for chest pain, palpitations, tachycardia, orthopnea, and edema.      RESPIRATORY:  Negative for cough, dyspnea, and hemoptysis.      NEUROLOGICAL:  Negative for dizziness, headaches, paresthesias, and weakness.          PM/FM/:     Last Reviewed on 2019 04:04 PM by Gisel Gr    Past Medical History:                 PAST MEDICAL HISTORY         Sleep Apnea: dx'd in 2019; (+) sleep study; uses CPAP;         GYNECOLOGICAL HISTORY:     miscarriage ONE    Last mammogram was          CURRENT MEDICAL PROVIDERS:    Cardiologist: [facility]         Surgical History:         Dilation and Curettage    DEFIBRILLATOR PLACED;     Procedures: cardiac ablation          Family History:         Positive for Coronary Artery Disease and Cardiomyopathy--brother (Jocelyn Lopez) S/P transplant.      Positive for Parkinson's Disease.  Father:  at age 54; Cause of death was cardiomyopathy     Mother: Anxiety     Brother(s): 3 brother(s) total; 1 ;  cardiomyopathy     Sister(s): 4 sister(s) total         Social History:     Occupation:  Disabled (due to heart related 8-2018)     Marital Status:      Children: None         Tobacco/Alcohol/Supplements:     Last Reviewed on 8/06/2019 03:54 PM by Kathy Rico    Tobacco: She has a past history of cigarette smoking; quit date:  march 2016.  Non-drinker     Caffeine:  She admits to consuming caffeine via -LITTLE.              Immunizations:     None        Allergies:     Last Reviewed on 8/06/2019 03:54 PM by Kathy Rico      No Known Drug Allergies.         Current Medications:     Last Reviewed on 8/06/2019 03:54 PM by Kathy Rico    Zoloft 50mg Tablet 1 a day     Entresto 49mg/51mg Tablet Take 1 tablet(s) by mouth bid     81mg asa q day     Carvedilol 25mg Tablet 1 po bid     Furosemide 40mg Tablets 1 by mouth  daily     Potassium Chloride 20mEq Tablets, Extended Release 1 tabs QOD taking rx QD     Spironolactone 25mg Tablet Take 1/2 tab daily         OBJECTIVE:        Vitals:         Current: 8/6/2019 3:56:01 PM    Ht:  5 ft, 4 in;  Wt: 242.2 lbs;  BMI: 41.6    T: 98.1 F (oral);  BP: 94/61 mm Hg (left arm, sitting);  P: 82 bpm (left arm (BP Cuff), sitting);  sCr: 0.7 mg/dL;  GFR: 117.95        Exams:     PHYSICAL EXAM:     GENERAL: vital signs recorded - well developed, well nourished;  no apparent distress;     NECK: carotid exam reveals no bruits;     RESPIRATORY: normal respiratory rate and pattern with no distress; normal breath sounds with no rales, rhonchi, wheezes or rubs;     CARDIOVASCULAR: normal rate; rhythm is regular;  no systolic murmur; no edema;     PSYCHIATRIC:  appropriate affect and demeanor; normal speech pattern; grossly normal memory;         ASSESSMENT           300.02   F41.9  Anxiety              DDx:     425.4   I49.3  Other primary cardiomyopathy              DDx:     V79.0   Z13.89  Screening for depression              DDx:         ORDERS:         Meds Prescribed:       Refill of: Zoloft (Sertraline HCl) 50mg Tablet 1 a day  #90 (Ninety) tablet(s)  Refills: 3         Other Orders:         Depression screen negative  (In-House)           Negative EtOH screen  (In-House)                   PLAN:          Anxiety         FOLLOW-UP: Schedule follow-up appointments on a p.r.n. basis.            Prescriptions:       Refill of: Zoloft (Sertraline HCl) 50mg Tablet 1 a day  #90 (Ninety) tablet(s) Refills: 3          Other primary cardiomyopathy 9-25-19 is her follow up with Dr. Gonzales          Screening for depression     MIPS PHQ-9 Depression Screening: Completed form scanned and in chart; Total Score 3; Negative Depression Screen Negative alcohol screen           Orders:         Depression screen negative  (In-House)           Negative EtOH screen  (In-House)               Patient Recommendations:        For  Anxiety:     Schedule follow-up appointments as needed.              CHARGE CAPTURE           **Please note: ICD descriptions below are intended for billing purposes only and may not represent clinical diagnoses**        Primary Diagnosis:         300.02 Anxiety            F41.9    Anxiety disorder, unspecified              Orders:          [id number]   Office/outpatient visit; established patient, level 3  (In-House)           425.4 Other primary cardiomyopathy            I49.3    Ventricular premature depolarization    V79.0 Screening for depression            Z13.89    Encounter for screening for other disorder              Orders:             Depression screen negative  (In-House)                Negative EtOH screen  (In-House)

## 2021-05-18 NOTE — PROGRESS NOTES
Ashely Moore  1970     Office/Outpatient Visit    Visit Date:  04:00 pm    Provider: Gisel Gr NGuidoPGuido (Assistant: Rosalba Soalno MA)    Location: [facility]        Electronically signed by Gisel Gr N.P. on  2020 05:21:10 PM                             Subjective:        CC: (CARVEDILOL IS NOT ON PTS LIST)(DISCUSS ZOLOFT)Ms. Moore is a 49 year old White female.  This is a follow-up visit.          HPI:           Patient presents with anxiety disorder, unspecified.  Current treatment includes Zoloft.  Has had some weight gain. She does have a cardiac condition and that contributes to not being as active.  With COVID life has been more challenging.      ROS:     CONSTITUTIONAL:  Positive for unintentional weight gain ( 22 pounds ).   Negative for fever.      CARDIOVASCULAR:  Negative for chest pain, palpitations, tachycardia, orthopnea, and edema.      RESPIRATORY:  Negative for recent cough and dyspnea.      NEUROLOGICAL:  Negative for dizziness, headaches, paresthesias, and weakness.      PSYCHIATRIC:  Negative for suicidal thoughts.          Past Medical History / Family History / Social History:         Last Reviewed on 2020 04:19 PM by Gisel Gr    Past Medical History:                 PAST MEDICAL HISTORY         Sleep Apnea: dx'd in 2019; (+) sleep study; uses CPAP;         GYNECOLOGICAL HISTORY:     miscarriage ONE    Last mammogram was          CURRENT MEDICAL PROVIDERS:    Cardiologist: Kobe Brunson         Surgical History:         Dilation and Curettage    DEFIBRILLATOR PLACED;     Procedures: cardiac ablation          Family History:         Positive for Coronary Artery Disease and Cardiomyopathy--brother (Jocelyn Lopez) S/P transplant.      Positive for Parkinson's Disease.  Father:  at age 54; Cause of death was cardiomyopathy     Mother: Anxiety     Brother(s): 3 brother(s) total; 1 ;   cardiomyopathy     Sister(s): 4 sister(s) total         Social History:     Occupation: Disabled (due to heart related 8-2018)     Marital Status:      Children: None         Tobacco/Alcohol/Supplements:     Last Reviewed on 7/30/2020 04:02 PM by Rosalba Solano    Tobacco: She has a past history of cigarette smoking; quit date:  march 2016.  Non-drinker     Caffeine:  She admits to consuming caffeine via -LITTLE.          Immunizations:     None        Allergies:     Last Reviewed on 7/30/2020 04:02 PM by Rosalba Solano    No Known Allergies.        Current Medications:     Last Reviewed on 7/30/2020 04:02 PM by Rosalba Solano    Potassium Chloride 20mEq Tablets, Extended Release [ 1 tabs QOD]    Spironolactone 25mg Tablet [Take 1/2 tab daily]    Furosemide 40mg Tablets [1 by mouth  daily]    Carvedilol 25mg Tablet [1 po bid]    81mg asa q day    Zoloft 50mg Tablet [1 a day]    Entresto 49mg/51mg Tablet [Take 1 tablet(s) by mouth bid]    METOPROL SUC TAB 50MG ER  [1-2XS DAILY]        Objective:        Vitals:         Current: 7/30/2020 4:08:20 PM    Ht:  5 ft, 4 in;  Wt: 264 lbs;  BMI: 45.3T: 96.9 F (oral);  BP: 106/72 mm Hg (right arm, sitting);  P: 106 bpm (left arm (BP Cuff), sitting);  sCr: 0.7 mg/dL;  GFR: 121.04O2 Sat: 97 % (room air)        Exams:     PHYSICAL EXAM:     GENERAL: vital signs recorded - well developed, well nourished;  no apparent distress;     RESPIRATORY: normal respiratory rate and pattern with no distress; normal breath sounds with no rales, rhonchi, wheezes or rubs;     CARDIOVASCULAR: normal rate; rhythm is regular;  no systolic murmur; no edema;     PSYCHIATRIC:  appropriate affect and demeanor; normal speech pattern; grossly normal memory;         Assessment:         F41.9   Anxiety disorder, unspecified       R63.5   Abnormal weight gain       I50.22   Chronic systolic (congestive) heart failure       425.4   Other primary cardiomyopathy   (Severe)     I49.3    Ventricular premature depolarization           ORDERS:         Lab Orders:       [id number]  [facility] CBC with 3 part diff  (Send-Out)            72868  COMP - [facility] Comp. Metabolic Panel  (Send-Out)            31950  TSH - [facility] TSH  (Send-Out)                      Plan:         Anxiety disorder, unspecifiedwill switch off zoloft and taper over the next few weeks and then to go ahead and start wellbutrin        FOLLOW-UP: Advised to call if there is no improvement in 3-4 week(s).          Abnormal weight gainreviewed previous labs, will check some labs today     LABORATORY:  Labs ordered to be performed today include CBC, Comprehensive metabolic panel, and TSH.            Orders:       [id number]  [facility] CBC with 3 part diff  (Send-Out)            01995  [facility] Comp. Metabolic Panel  (Send-Out)            51352  TSH - [facility] TSH  (Send-Out)              Chronic systolic (congestive) heart failuresend for her recent ECHO, reviewed cardiology note form 6-2020            Charge Capture:         Primary Diagnosis:     F41.9  Anxiety disorder, unspecified           Orders:      32173  Office/outpatient visit; established patient, level 3  (In-House)              R63.5  Abnormal weight gain     I50.22  Chronic systolic (congestive) heart failure     425.4  Other primary cardiomyopathy     I49.3  Ventricular premature depolarization

## 2021-05-18 NOTE — PROGRESS NOTES
Ashely Moore  1970     Office/Outpatient Visit    Visit Date:  02:43 pm    Provider: Gisel Gr NALEJANDRA (Assistant: Gisel Alvarez LPN)    Location: [facility]        Electronically signed by Gisel Gr N.P. on  2021 05:25:20 PM                             Subjective:        CC: Ms. Moore is a 50 year old White female.  This is a follow-up visit.  Med Refills;         HPI:       chronic heart failure     Sees Cardiology, has a defib           Dx with anxiety disorder, unspecified; current treatment includes Zoloft.  Tried wellbutrin, but switched back to zoloft and prefers that rx.    ROS:     CONSTITUTIONAL:  Positive for unintentional weight gain.   Negative for fever.      CARDIOVASCULAR:  Negative for chest pain, palpitations, tachycardia, orthopnea, and edema.      RESPIRATORY:  Positive for dyspnea ( with moderate exertion ).   Negative for recent cough.  SOA started after having a block when they did a manipulation to her right shoulder 2020    GASTROINTESTINAL:  Positive for acid reflux symptoms ( occ ) and abdominal bloating.   Negative for melena.      MUSCULOSKELETAL:  Positive for right shoulder pain, sees ortho, Dr Gildardo Koenig, appt tomorrow.      NEUROLOGICAL:  Negative for dizziness, headaches, paresthesias, and weakness.          Past Medical History / Family History / Social History:         Last Reviewed on 2021 03:08 PM by Gisel Gr    Past Medical History:                 PAST MEDICAL HISTORY         Sleep Apnea: dx'd in 2019; (+) sleep study; uses CPAP;         GYNECOLOGICAL HISTORY:     miscarriage ONE    Last mammogram was          CURRENT MEDICAL PROVIDERS:    Cardiologist: Kobe Brunson         Surgical History:     Other Surgeries    Dilation and Curettage    DEFIBRILLATOR PLACED;    right shoulder manipulatioin 2020;     Procedures: cardiac ablation          Family History:          Positive for Coronary Artery Disease and Cardiomyopathy--brother (Jocelyn Lopez) S/P transplant.      Positive for Parkinson's Disease.  Father:  at age 54; Cause of death was cardiomyopathy     Mother: Anxiety     Brother(s): 3 brother(s) total; 1 ;  cardiomyopathy     Sister(s): 4 sister(s) total         Social History:     Occupation: Disabled (due to heart related )     Marital Status:      Children: None         Tobacco/Alcohol/Supplements:     Last Reviewed on 2021 02:46 PM by Gisel Alvarez    Tobacco: She has a past history of cigarette smoking; quit date:  2016.  Non-drinker     Caffeine:  She admits to consuming caffeine via -LITTLE.          Immunizations:     None        Allergies:     Last Reviewed on 2021 02:46 PM by Gisel Alvarez    buPROPion HCL: Nausea  (Adverse Reaction)        Current Medications:     Last Reviewed on 2021 02:47 PM by Gisel Alvarez    Potassium Chloride 20 mEq oral Tablet, Extended Release Particles/Crystals [ 1 tabs QOD]    Spironolactone 25 mg oral tablet [Take 1/2 tab daily]    Furosemide 40 mg oral tablet [1 by mouth  daily]    Carvedilol 25 mg oral tablet [1 po bid]    81mg asa q day     Zoloft 50 mg oral tablet [TAKE ONE TABLET BY MOUTH DAILY]    Entresto 49-51 mg oral tablet [Take 1 tablet(s) by mouth bid]    METOPROL SUC TAB 50MG ER  [1-2XS DAILY]        Objective:        Vitals:         Current: 2021 2:49:32 PM    Ht:  5 ft, 4 in;  Wt: 279.4 lbs;  BMI: 48.0T: 96.4 F (oral);  BP: 107/77 mm Hg (left arm, sitting);  P: 95 bpm (left arm (BP Cuff), sitting);  sCr: 0.82 mg/dL;  GFR: 104.71O2 Sat: 96 % (room air)        Repeat:     3:28:40 PM  O2 Sat:   94% (room air, Wall[place] just up the carr and back)     Exams:     PHYSICAL EXAM:     GENERAL: vital signs recorded - well developed, well nourished;  no apparent distress;     NECK: thyroid exam reveals no nodules noted;  carotid exam reveals no bruits;     RESPIRATORY: normal  respiratory rate and pattern with no distress; normal breath sounds with no rales, rhonchi, wheezes or rubs;     CARDIOVASCULAR: normal rate; rhythm is regular;  no systolic murmur; no edema;     PSYCHIATRIC:  appropriate affect and demeanor; normal speech pattern; grossly normal memory;         Assessment:         I50.22   Chronic systolic (congestive) heart failure       F41.9   Anxiety disorder, unspecified       R06.00   Dyspnea, unspecified       R94.6   Abnormal results of thyroid function studies       Z12.11   Encounter for screening for malignant neoplasm of colon           ORDERS:         Meds Prescribed:       [Refilled] Zoloft 50 mg oral tablet [TAKE ONE TABLET BY MOUTH DAILY], #90 (ninety) tablets, Refills: 1 (one)         Radiology/Test Orders:       [id number]  Radiologic exam chest 2 views  (Send-Out)              Lab Orders:       [id number]  [facility] Comp. Metabolic Panel  (Send-Out)            [facility]  [facility] (93152, 79809)  (Send-Out)            65161  [facility] CBC with 3 part diff  (Send-Out)                      Plan:         Chronic systolic (congestive) heart failurereviewed last cardioloyg note, stable ,to follow up in 6 months        Anxiety disorder, unspecified          Prescriptions:       [Refilled] Zoloft 50 mg oral tablet [TAKE ONE TABLET BY MOUTH DAILY], #90 (ninety) tablets, Refills: 1 (one)         Dyspnea, unspecified        RADIOLOGY:  I have ordered a chest x-ray (PA and lateral) to be done today.      FOLLOW-UP: pending CXR results           Orders:       [id number]  Radiologic exam chest 2 views  (Send-Out)              Abnormal results of thyroid function studieswill recheck lab and then check on an endo appt, she was not able to get appt with endo, number she was given was incorrect    LABORATORY:  Labs ordered to be performed today include CBC, Comprehensive metabolic panel, and Thyroid Panel.            Orders:       [id number]  [facility] Comp. Metabolic  Panel  (Send-Out)            22293  [facility] - [facility] Thyroid panel with TSH ([id number], 37008)  (Send-Out)            26520  [facility] - [facility] CBC with 3 part diff  (Send-Out)              Encounter for screening for malignant neoplasm of colonhas never had colon screening, may need EGD, wants to see Dr Hogue / will work on this referral after labs            Charge Capture:         Primary Diagnosis:     I50.22  Chronic systolic (congestive) heart failure           Orders:      80167  Office/outpatient visit; established patient, level 4  (In-House)              F41.9  Anxiety disorder, unspecified     R06.00  Dyspnea, unspecified     R94.6  Abnormal results of thyroid function studies     Z12.11  Encounter for screening for malignant neoplasm of colon

## 2021-05-18 NOTE — PROGRESS NOTES
Ashely MooreGuido 1970     Office/Outpatient Visit    Visit Date:  01:54 pm    Provider: Gisel Gr N.P. (Assistant: Rosalba Solano MA)    Location: [facility]        Electronically signed by Gisel Gr N.P. on  2018 03:18:41 PM                             SUBJECTIVE:        CC:     Ms. Moore is a 47 year old White female.  routine follow up;         HPI:         PHQ-9 Depression Screening: Completed form scanned and in chart; Total Score 0 Alcohol Consumption Screening: Completed form scanned and in chart; Total Score 0         Anxiety details; current treatment includes Zoloft.  Takes for her work related stress.  Zoloft works well.     ROS:     CONSTITUTIONAL:  Negative for chills, fatigue, fever, and weight change.      CARDIOVASCULAR:  Negative for chest pain, palpitations, tachycardia, orthopnea, and edema.      RESPIRATORY:  Negative for cough, dyspnea, and hemoptysis.      NEUROLOGICAL:  Negative for dizziness, headaches, paresthesias, and weakness.          [facility]/:     Last Reviewed on 2018 02:38 PM by Gisel Gr    Past Medical History:                 PAST MEDICAL HISTORY             GYNECOLOGICAL HISTORY:     miscarriage ONE    Last mammogram was          CURRENT MEDICAL PROVIDERS:    Cardiologist: Kobe         Surgical History:         Dilation and Curettage    DEFIBRILLATOR PLACED;         Family History:         Positive for Coronary Artery Disease and Cardiomyopathy--brother (Jocelyn Lopez) S/P transplant.      Positive for Parkinson's Disease.  Father:  at age 54; Cause of death was cardiomyopathy     Mother: Anxiety     Brother(s): 3 brother(s) total; 1 ;  cardiomyopathy     Sister(s): 4 sister(s) total         Social History:     Occupation: [employer]     Marital Status:      Children: None         Tobacco/Alcohol/Supplements:     Last Reviewed on 2018 02:02 PM by Russ  Rosalba    Tobacco: She has a past history of cigarette smoking; quit date:  march 2016.  Non-drinker     Caffeine:  She admits to consuming caffeine via -LITTLE.              Immunizations:     None        Allergies:     Last Reviewed on 7/16/2018 01:59 PM by Rosalba Solano      No Known Drug Allergies.         Current Medications:     Last Reviewed on 7/16/2018 02:01 PM by Rosalba Solano    Zoloft 50mg Tablet 1 a day     Entresto 49mg/51mg Tablet Take 1 tablet(s) by mouth bid     81mg asa q day     Potassium Chloride 20mEq Injection     Spironolactone 25mg Tablet Take 1/2 tab daily     Carvedilol 25mg Tablet 1 po bid     Furosemide 40mg Tablets 1 by mouth  daily         OBJECTIVE:        Vitals:         Current: 7/16/2018 1:59:40 PM    Ht:  5 ft, 4 in;  Wt: 223.9 lbs;  BMI: 38.4    T: 97.9 F (oral);  BP: 102/67 mm Hg (left arm, sitting);  P: 86 bpm (left arm (BP Cuff), sitting);  sCr: 0.7 mg/dL;  GFR: 115.30        Exams:     PHYSICAL EXAM:     GENERAL: vital signs recorded - well developed, well nourished;  no apparent distress;     RESPIRATORY: normal respiratory rate and pattern with no distress; normal breath sounds with no rales, rhonchi, wheezes or rubs;     CARDIOVASCULAR: normal rate; rhythm is regular;  no systolic murmur;     PSYCHIATRIC:  appropriate affect and demeanor; normal speech pattern; grossly normal memory;         ASSESSMENT           300.02   F41.9  Anxiety              DDx:     V79.0   Z13.89  Screening for depression              DDx:         ORDERS:         Meds Prescribed:       Refill of: Zoloft (Sertraline HCl) 50mg Tablet 1 a day  #90 (Ninety) tablet(s) Refills: 3         Other Orders:         Negative EtOH screen  (In-House)                   PLAN:          Anxiety has a follow up at cardiology in August, will be needing a battery replacement soon /advised to get hep a vaccine         FOLLOW-UP: Schedule follow-up appointments on a p.r.n. basis.            Prescriptions:        Refill of: Zoloft (Sertraline HCl) 50mg Tablet 1 a day  #90 (Ninety) tablet(s) Refills: 3           Patient Education Handouts:       Hepatitis A           Screening for depression     MIPS PHQ-9 Depression Screening: Completed form scanned and in chart; Total Score 0 Negative alcohol screen           Orders:         Negative EtOH screen  (In-House)               Patient Recommendations:        For  Anxiety:     Schedule follow-up appointments as needed.              CHARGE CAPTURE           **Please note: ICD descriptions below are intended for billing purposes only and may not represent clinical diagnoses**        Primary Diagnosis:         300.02 Anxiety            F41.9    Anxiety disorder, unspecified              Orders:          17535   Office/outpatient visit; established patient, level 3  (In-House)           V79.0 Screening for depression            Z13.89    Encounter for screening for other disorder              Orders:             Negative EtOH screen  (In-House)

## 2021-06-07 ENCOUNTER — OFFICE VISIT (OUTPATIENT)
Dept: ORTHOPEDIC SURGERY | Facility: CLINIC | Age: 51
End: 2021-06-07

## 2021-06-07 VITALS — HEIGHT: 63 IN | WEIGHT: 270 LBS | TEMPERATURE: 96 F | BODY MASS INDEX: 47.84 KG/M2

## 2021-06-07 DIAGNOSIS — Z09 SURGERY FOLLOW-UP: Primary | ICD-10-CM

## 2021-06-07 PROCEDURE — 99024 POSTOP FOLLOW-UP VISIT: CPT | Performed by: ORTHOPAEDIC SURGERY

## 2021-06-07 NOTE — PROGRESS NOTES
Chief complaint: Follow-up now approximately 2.5 months s/p right shoulder arthroscopic synovectomy, biceps tenotomy     Ms. Moore follows up today for her right shoulder.  She says it is doing great.  Pain is nearly resolved and her motion is basically back to normal.  She is very happy with her progress.    Portals are healed.  Motion is excellent except for internal rotation which is limited to about T12.  Good strength with abduction and elevation in the scapular plane.  No pain with any range of motion or resistive testing of her strength.    Assessment: 2.5-month status post right shoulder arthroscopic synovectomy and biceps tenotomy    Plan: She seems to be doing great.  She can follow-up as needed.

## 2021-07-01 VITALS
BODY MASS INDEX: 38.62 KG/M2 | WEIGHT: 226.2 LBS | HEART RATE: 111 BPM | DIASTOLIC BLOOD PRESSURE: 55 MMHG | TEMPERATURE: 98.5 F | SYSTOLIC BLOOD PRESSURE: 104 MMHG | HEIGHT: 64 IN

## 2021-07-01 VITALS
DIASTOLIC BLOOD PRESSURE: 61 MMHG | TEMPERATURE: 98.1 F | HEART RATE: 82 BPM | WEIGHT: 242.2 LBS | HEIGHT: 64 IN | BODY MASS INDEX: 41.35 KG/M2 | SYSTOLIC BLOOD PRESSURE: 94 MMHG

## 2021-07-01 VITALS
SYSTOLIC BLOOD PRESSURE: 102 MMHG | HEIGHT: 64 IN | BODY MASS INDEX: 38.22 KG/M2 | TEMPERATURE: 97.9 F | DIASTOLIC BLOOD PRESSURE: 67 MMHG | HEART RATE: 86 BPM | WEIGHT: 223.9 LBS

## 2021-07-02 VITALS
OXYGEN SATURATION: 97 % | HEIGHT: 64 IN | WEIGHT: 264 LBS | HEART RATE: 106 BPM | TEMPERATURE: 96.9 F | BODY MASS INDEX: 45.07 KG/M2 | DIASTOLIC BLOOD PRESSURE: 72 MMHG | SYSTOLIC BLOOD PRESSURE: 106 MMHG

## 2021-07-02 VITALS
HEIGHT: 64 IN | OXYGEN SATURATION: 94 % | HEART RATE: 95 BPM | TEMPERATURE: 96.4 F | WEIGHT: 279.4 LBS | BODY MASS INDEX: 47.7 KG/M2 | DIASTOLIC BLOOD PRESSURE: 77 MMHG | SYSTOLIC BLOOD PRESSURE: 107 MMHG

## 2021-09-01 NOTE — TELEPHONE ENCOUNTER
Rx Refill Note  Requested Prescriptions     Pending Prescriptions Disp Refills   • sertraline (ZOLOFT) 50 MG tablet [Pharmacy Med Name: SERTRALINE HCL 50 MG TABLET] 90 tablet      Sig: TAKE ONE TABLET BY MOUTH DAILY      Last office visit with prescribing clinician: 2/2/21    Next office visit with prescribing clinician: Visit date not found          {TIP  Is Refill Pharmacy correct?YES  Rut Betts  09/01/21, 15:38 EDT

## 2021-10-04 ENCOUNTER — OFFICE VISIT (OUTPATIENT)
Dept: FAMILY MEDICINE CLINIC | Age: 51
End: 2021-10-04

## 2021-10-04 VITALS
BODY MASS INDEX: 47.44 KG/M2 | SYSTOLIC BLOOD PRESSURE: 117 MMHG | DIASTOLIC BLOOD PRESSURE: 75 MMHG | HEART RATE: 74 BPM | WEIGHT: 267.8 LBS

## 2021-10-04 DIAGNOSIS — F41.8 DEPRESSION WITH ANXIETY: Primary | ICD-10-CM

## 2021-10-04 PROCEDURE — 99213 OFFICE O/P EST LOW 20 MIN: CPT | Performed by: NURSE PRACTITIONER

## 2021-10-04 RX ORDER — BUMETANIDE 2 MG/1
2 TABLET ORAL 2 TIMES DAILY
COMMUNITY
Start: 2021-08-10

## 2021-10-04 NOTE — PROGRESS NOTES
Ashely Moore presents to [facility].    Chief Complaint:  Hypertension (follow up ) and Anxiety (follow up )         History of Present Illness:    Anxiety/ Depression  Current medication/zoloft   Tolerating medication Yes  Current symptoms: doing well on rx / needs refills to jaleel       PMH: hospitalized 8-25-21 for her CHF, trying to get put on Farxiga and was seen last week at cardiology and then has a follow up later this month / had labs last week          Review of Systems:  Review of Systems   Constitutional: Negative for fatigue and fever.   Respiratory: Negative for cough and shortness of breath.    Cardiovascular: Positive for leg swelling (is much better due to a change from cardiology with her rx ). Negative for chest pain and palpitations.   Neurological: Negative for numbness.          Vital Signs:   /75 (BP Location: Right arm, Patient Position: Sitting)   Pulse 74   Wt 121 kg (267 lb 12.8 oz)   BMI 47.44 kg/m²       Physical Exam:  Physical Exam  Vitals reviewed.   Constitutional:       General: She is not in acute distress.     Appearance: Normal appearance.   Neck:      Vascular: No carotid bruit.   Cardiovascular:      Rate and Rhythm: Normal rate and regular rhythm.      Heart sounds: Normal heart sounds. No murmur heard.     Pulmonary:      Effort: Pulmonary effort is normal. No respiratory distress.      Breath sounds: Normal breath sounds.   Musculoskeletal:      Right lower leg: No edema.      Left lower leg: No edema.   Neurological:      Mental Status: She is alert.   Psychiatric:         Mood and Affect: Mood normal.         Behavior: Behavior normal.         Result Review      The following data was reviewed by: APRYL Barrow on 10/04/2021:    Results for orders placed or performed during the hospital encounter of 03/23/21   Basic Metabolic Panel    Specimen: Arm, Right; Blood   Result Value Ref Range    Glucose 111 (H) 65 - 99  mg/dL    BUN 21 (H) 6 - 20 mg/dL    Creatinine 0.90 0.57 - 1.00 mg/dL    Sodium 141 136 - 145 mmol/L    Potassium 4.0 3.5 - 5.2 mmol/L    Chloride 103 98 - 107 mmol/L    CO2 26.7 22.0 - 29.0 mmol/L    Calcium 9.3 8.6 - 10.5 mg/dL    eGFR Non African Amer 66 >60 mL/min/1.73    BUN/Creatinine Ratio 23.3 7.0 - 25.0    Anion Gap 11.3 5.0 - 15.0 mmol/L               Assessment and Plan:          Diagnoses and all orders for this visit:    1. Depression with anxiety (Primary)  Assessment & Plan:  Discussed when to get her covid pfizer booster vaccine in   Advised to get flu vaccine, declined     Orders:  -     sertraline (ZOLOFT) 50 MG tablet; Take 1 tablet by mouth Daily.  Dispense: 90 tablet; Refill: 1        Follow Up   Return in about 6 months (around 4/4/2022) for and with cardiology later this month .  Patient was given instructions and counseling regarding her condition or for health maintenance advice. Please see specific information pulled into the AVS if appropriate.

## 2022-03-30 DIAGNOSIS — F41.8 DEPRESSION WITH ANXIETY: ICD-10-CM

## 2022-04-27 DIAGNOSIS — F41.8 DEPRESSION WITH ANXIETY: ICD-10-CM

## 2022-05-25 DIAGNOSIS — F41.8 DEPRESSION WITH ANXIETY: ICD-10-CM

## 2022-06-07 ENCOUNTER — OFFICE VISIT (OUTPATIENT)
Dept: FAMILY MEDICINE CLINIC | Age: 52
End: 2022-06-07

## 2022-06-07 VITALS
HEIGHT: 63 IN | WEIGHT: 280.4 LBS | HEART RATE: 79 BPM | SYSTOLIC BLOOD PRESSURE: 95 MMHG | TEMPERATURE: 98.3 F | DIASTOLIC BLOOD PRESSURE: 67 MMHG | BODY MASS INDEX: 49.68 KG/M2

## 2022-06-07 DIAGNOSIS — I10 ESSENTIAL HYPERTENSION: Primary | ICD-10-CM

## 2022-06-07 DIAGNOSIS — F41.8 DEPRESSION WITH ANXIETY: ICD-10-CM

## 2022-06-07 PROCEDURE — 99213 OFFICE O/P EST LOW 20 MIN: CPT | Performed by: NURSE PRACTITIONER

## 2022-06-07 RX ORDER — AMIODARONE HYDROCHLORIDE 200 MG/1
1 TABLET ORAL DAILY
COMMUNITY
Start: 2022-03-03 | End: 2023-01-12 | Stop reason: ALTCHOICE

## 2022-06-07 RX ORDER — DAPAGLIFLOZIN 10 MG/1
1 TABLET, FILM COATED ORAL DAILY
COMMUNITY
Start: 2022-04-07 | End: 2023-01-12 | Stop reason: ALTCHOICE

## 2022-06-07 NOTE — PROGRESS NOTES
Ashely Moore presents to [facility].    Chief Complaint:  Depression (Follow up) and Hypertension         History of Present Illness:  Hypertension:  She sees Christian and Kobe  / has an upcoming appt   Feels much better since her pacemaker and ablation in   Labs:Lab 12-3-21 creat 1.5, GFR 32  Lab Results       Component                Value               Date                       GLUCOSE                  111 (H)             2021                 BUN                      21 (H)              2021                 CREATININE               0.90                2021                 EGFRIFNONA               66                  2021                 BCR                      23.3                2021                 K                        4.0                 2021                 CO2                      26.7                2021                 CALCIUM                  9.3                 2021                 ALBUMIN                  4.1                 2021                 LABIL2                   1.4                 2021                 AST                      25                  2021                 ALT                      24                  2021              Anxiety/ Depression  Current medication/zoloft   Tolerating medication Yes  Current symptoms: none rx working     PMH:     hospitalized 21 for her CHF,     Ablation and New pacemaker 21          Sleep Apnea: dx'd in 2019; (+) sleep study; uses CPAP;     CHF sees cardiology      GYNECOLOGICAL HISTORY:     miscarriage ONE    Last mammogram was          CURRENT MEDICAL PROVIDERS:    Cardiologist: Kobe /Christian         Surgical History:     Other Surgeries    Dilation and Curettage    DEFIBRILLATOR PLACED;    right shoulder manipulatioin 2020;     Procedures: cardiac ablation          Family History:         Positive for  "Coronary Artery Disease and Cardiomyopathy--brother (Jocelyn Lopez) S/P transplant.      Positive for Parkinson's Disease.  Father:  at age 54; Cause of death was cardiomyopathy     Mother: Anxiety     Brother(s): 3 brother(s) total; 1 ;  cardiomyopathy     Sister(s): 4 sister(s) total         Social History:     Occupation: Disabled (due to heart related )     Marital Status:      Children: None       Review of Systems:  Review of Systems   Constitutional: Negative for fatigue and fever.   Respiratory: Negative for cough and shortness of breath.    Cardiovascular: Negative for chest pain, palpitations and leg swelling.   Neurological: Negative for numbness.          Current Outpatient Medications:   •  amiodarone (PACERONE) 200 MG tablet, Take 1 tablet by mouth Daily.  , Disp: , Rfl:   •  aspirin 81 MG EC tablet, Take 81 mg by mouth Every Morning. Pt told by cardiologist to do what surgeon says and pt called surgeon and he said to stop day before so last dose will be 3/21/2021, Disp: , Rfl:   •  bumetanide (BUMEX) 2 MG tablet, Take 2 mg by mouth 2 (two) times a day., Disp: , Rfl:   •  Farxiga 10 MG tablet, Take 1 tablet by mouth Daily., Disp: , Rfl:   •  metoprolol succinate XL (TOPROL-XL) 50 MG 24 hr tablet, Take 50 mg by mouth 2 (two) times a day., Disp: , Rfl:   •  potassium chloride (KLOR-CON) 20 MEQ packet, Take 20 mEq by mouth 2 (Two) Times a Day., Disp: , Rfl:   •  sacubitril-valsartan (ENTRESTO) 49-51 MG tablet, Take 1 tablet by mouth 2 (Two) Times a Day., Disp: , Rfl:   •  sertraline (ZOLOFT) 50 MG tablet, Take 1 tablet by mouth Daily., Disp: 90 tablet, Rfl: 3  •  spironolactone (ALDACTONE) 25 MG tablet, Take 12.5 mg by mouth Every Night., Disp: , Rfl:     Vital Signs:   Vitals:    22 1403   BP: 95/67   BP Location: Left arm   Patient Position: Sitting   Pulse: 79   Temp: 98.3 °F (36.8 °C)   TempSrc: Oral   Weight: 127 kg (280 lb 6.4 oz)   Height: 160 cm (63\")         Physical " Exam:  Physical Exam  Vitals reviewed.   Constitutional:       General: She is not in acute distress.     Appearance: Normal appearance.   Neck:      Vascular: No carotid bruit.   Cardiovascular:      Rate and Rhythm: Normal rate and regular rhythm.      Heart sounds: Normal heart sounds. No murmur heard.  Pulmonary:      Effort: Pulmonary effort is normal. No respiratory distress.      Breath sounds: Normal breath sounds.   Musculoskeletal:      Right lower leg: No edema.      Left lower leg: No edema.   Neurological:      Mental Status: She is alert.   Psychiatric:         Mood and Affect: Mood normal.         Behavior: Behavior normal.         Result Review      The following data was reviewed by: APRYL Barrow on 06/07/2022:    Results for orders placed or performed during the hospital encounter of 03/23/21   Basic Metabolic Panel    Specimen: Arm, Right; Blood   Result Value Ref Range    Glucose 111 (H) 65 - 99 mg/dL    BUN 21 (H) 6 - 20 mg/dL    Creatinine 0.90 0.57 - 1.00 mg/dL    Sodium 141 136 - 145 mmol/L    Potassium 4.0 3.5 - 5.2 mmol/L    Chloride 103 98 - 107 mmol/L    CO2 26.7 22.0 - 29.0 mmol/L    Calcium 9.3 8.6 - 10.5 mg/dL    eGFR Non African Amer 66 >60 mL/min/1.73    BUN/Creatinine Ratio 23.3 7.0 - 25.0    Anion Gap 11.3 5.0 - 15.0 mmol/L               Assessment and Plan:          Diagnoses and all orders for this visit:    1. Essential hypertension (Primary)  Assessment & Plan:  She is followed by cardiology, reviewed last lab in [facility], , keep her upcoming cardiology appt next month        2. Depression with anxiety  Assessment & Plan:  Continue current rx    Orders:  -     sertraline (ZOLOFT) 50 MG tablet; Take 1 tablet by mouth Daily.  Dispense: 90 tablet; Refill: 3        Follow Up   Return for PRN.  Patient was given instructions and counseling regarding her condition or for health maintenance advice. Please see specific information pulled into the AVS if appropriate.

## 2022-06-07 NOTE — ASSESSMENT & PLAN NOTE
She is followed by cardiology, reviewed last lab in [facility], , keep her upcoming cardiology appt next month

## 2022-12-12 ENCOUNTER — TELEPHONE (OUTPATIENT)
Dept: FAMILY MEDICINE CLINIC | Age: 52
End: 2022-12-12

## 2022-12-12 ENCOUNTER — READMISSION MANAGEMENT (OUTPATIENT)
Dept: CALL CENTER | Facility: HOSPITAL | Age: 52
End: 2022-12-12

## 2022-12-12 NOTE — OUTREACH NOTE
Prep Survey    Flowsheet Row Responses   [facility] facility patient discharged from? Non-BH   Is LACE score < 7 ? Non-BH Discharge   Emergency Room discharge w/ pulse ox? No   Eligibility [facility]   Date of Discharge 12/13/22   Discharge Disposition Home or Self Care   Discharge diagnosis CHF   Does the patient have one of the following disease processes/diagnoses(primary or secondary)? CHF   Prep survey completed? Yes          CIRILO CASH - Registered Nurse

## 2022-12-12 NOTE — TELEPHONE ENCOUNTER
Caller: [facility]    Relationship to patient:     Best call back number: [phone]    New or established patient?  [] New  [x] Established    Date of discharge: 12/13/12    Facility discharged from: [facility]    Diagnosis/Symptoms: CONGESTIVE HEART FAILURE    Length of stay (If applicable): 12.5.22-12.13.22

## 2022-12-14 ENCOUNTER — TRANSITIONAL CARE MANAGEMENT TELEPHONE ENCOUNTER (OUTPATIENT)
Dept: CALL CENTER | Facility: HOSPITAL | Age: 52
End: 2022-12-14

## 2022-12-14 NOTE — OUTREACH NOTE
Call Center TCM Note    Flowsheet Row Responses   [facility] [place] patient discharged from? Non-   Does the patient have one of the following disease processes/diagnoses(primary or secondary)? CHF   TCM attempt successful? Yes   Call start time 1530   Change in Health Status Readmitted  [Pt was never dc'd]   Call end time 1531   Discharge diagnosis CHF   Person spoke with today (if not patient) and relationship Patient   Did the patient receive a copy of their discharge instructions? Yes   Nursing interventions Reviewed instructions with patient   What is the patient's perception of their health status since discharge? Improving   Is the patient/caregiver able to teach back signs and symptoms related to disease process for when to call PCP? Yes   Is the patient/caregiver able to teach back signs and symptoms related to disease process for when to call 911? Yes   Is the patient/caregiver able to teach back the hierarchy of who to call/visit for symptoms/problems? PCP, Specialist, Home health nurse, Urgent Care, ED, 911 Yes   If the patient is a current smoker, are they able to teach back resources for cessation? Not a smoker   Call end time 1531          Stephani Decker RN    12/14/2022, 15:32 EST

## 2022-12-20 ENCOUNTER — TELEPHONE (OUTPATIENT)
Dept: FAMILY MEDICINE CLINIC | Age: 52
End: 2022-12-20

## 2022-12-20 NOTE — TELEPHONE ENCOUNTER
Patient was admitted to [facility] on 12/4/22 and was discharged on 12/16/22. She was admitted due to congestive heart failure and fluid build up. There are no SABRINA appt within the 14 day time fram with PCP. Please advise.

## 2023-01-12 ENCOUNTER — OFFICE VISIT (OUTPATIENT)
Dept: FAMILY MEDICINE CLINIC | Age: 53
End: 2023-01-12
Payer: COMMERCIAL

## 2023-01-12 VITALS
SYSTOLIC BLOOD PRESSURE: 110 MMHG | DIASTOLIC BLOOD PRESSURE: 66 MMHG | WEIGHT: 266.8 LBS | HEART RATE: 83 BPM | HEIGHT: 63 IN | OXYGEN SATURATION: 95 % | BODY MASS INDEX: 47.27 KG/M2

## 2023-01-12 DIAGNOSIS — I10 ESSENTIAL HYPERTENSION: ICD-10-CM

## 2023-01-12 DIAGNOSIS — Z71.9 HEALTH COUNSELING: ICD-10-CM

## 2023-01-12 DIAGNOSIS — I50.9 ACUTE ON CHRONIC CONGESTIVE HEART FAILURE, UNSPECIFIED HEART FAILURE TYPE: Primary | ICD-10-CM

## 2023-01-12 DIAGNOSIS — F41.8 DEPRESSION WITH ANXIETY: ICD-10-CM

## 2023-01-12 PROBLEM — M75.01 ADHESIVE CAPSULITIS OF RIGHT SHOULDER: Status: RESOLVED | Noted: 2020-11-16 | Resolved: 2023-01-12

## 2023-01-12 PROBLEM — Z53.20 MAMMOGRAM DECLINED: Status: ACTIVE | Noted: 2023-01-12

## 2023-01-12 PROBLEM — G89.29 CHRONIC RIGHT SHOULDER PAIN: Status: RESOLVED | Noted: 2021-02-05 | Resolved: 2023-01-12

## 2023-01-12 PROBLEM — G47.33 OBSTRUCTIVE SLEEP APNEA SYNDROME: Status: ACTIVE | Noted: 2021-11-09

## 2023-01-12 PROBLEM — Z95.810 AUTOMATIC IMPLANTABLE CARDIAC DEFIBRILLATOR IN SITU: Status: ACTIVE | Noted: 2018-02-28

## 2023-01-12 PROBLEM — Z53.20 COLONOSCOPY REFUSED: Status: ACTIVE | Noted: 2023-01-12

## 2023-01-12 PROBLEM — M25.511 CHRONIC RIGHT SHOULDER PAIN: Status: RESOLVED | Noted: 2021-02-05 | Resolved: 2023-01-12

## 2023-01-12 PROCEDURE — 99214 OFFICE O/P EST MOD 30 MIN: CPT | Performed by: FAMILY MEDICINE

## 2023-01-12 RX ORDER — SERTRALINE HYDROCHLORIDE 100 MG/1
100 TABLET, FILM COATED ORAL DAILY
Qty: 90 TABLET | Refills: 1 | Status: SHIPPED | OUTPATIENT
Start: 2023-01-12

## 2023-01-12 NOTE — ASSESSMENT & PLAN NOTE
Patient's depression is single episode and is moderate without psychosis. Their depression is currently active and the condition is worsening. This will be reassessed at the next regular appointment. F/U as described:patient will continue current medication therapy and patient was prescribed an antidepressant medicine   WILL INCREASE THE ZOLOFT AS NOTED .

## 2023-01-12 NOTE — PROGRESS NOTES
Chief Complaint  Hospital Follow Up Visit ([facility] from 12-4-22 until 12-16-22 for CHF, fluid build up)    Subjective          Ashely Moore presents to [facility]  History of Present Illness  --TOLERATING BLOOD PRESSURE MEDICATION WITHOUT APPARENT SIDE EFFECTS  --HISTORY OF A CARDIOMYOPATHY WITH CHF S/P A RECENT HOSPITAL ADMISSION DUE TO FLUID OVERLOAD.  MEDS WERE ADJUSTED, FEELS AT BASELINE NOW.  --HAS BEEN ON ZOLOFT FOR DEPRESSION FOR QUITE SOME TIME BUT WOULD LIKE TO INCREASE DOSING DUE TO HAVING MORE ANXIETY  --DUE FOR A MAMMOGRAM AND COLONOSCOPY         No Known Allergies     Health Maintenance Due   Topic Date Due   • MAMMOGRAM  Never done   • COLORECTAL CANCER SCREENING  Never done   • LUNG CANCER SCREENING  Never done   • HEPATITIS C SCREENING  Never done   • ANNUAL PHYSICAL  Never done   • PAP SMEAR  Never done        Current Outpatient Medications on File Prior to Visit   Medication Sig   • aspirin 81 MG EC tablet Take 81 mg by mouth Every Morning. Pt told by cardiologist to do what surgeon says and pt called surgeon and he said to stop day before so last dose will be 3/21/2021   • bumetanide (BUMEX) 2 MG tablet Take 2 mg by mouth 2 (two) times a day.   • metoprolol succinate XL (TOPROL-XL) 50 MG 24 hr tablet Take 50 mg by mouth 2 (two) times a day.   • potassium chloride (KLOR-CON) 20 MEQ packet Take 20 mEq by mouth 2 (Two) Times a Day.   • sacubitril-valsartan (ENTRESTO) 49-51 MG tablet Take 1 tablet by mouth 2 (Two) Times a Day.   • spironolactone (ALDACTONE) 25 MG tablet Take 12.5 mg by mouth Every Night.   • [DISCONTINUED] sertraline (ZOLOFT) 50 MG tablet Take 1 tablet by mouth Daily.   • [DISCONTINUED] amiodarone (PACERONE) 200 MG tablet Take 1 tablet by mouth Daily.     • [DISCONTINUED] Farxiga 10 MG tablet Take 1 tablet by mouth Daily.     No current facility-administered medications on file prior to visit.       Immunization History   Administered Date(s)  "Administered   • COVID-19 (MODERNA) 1st, 2nd, 3rd Dose Only 01/04/2022   • COVID-19 (PFIZER) PURPLE CAP 03/17/2021, 05/17/2021       Review of Systems   Constitutional: Positive for fatigue. Negative for activity change, appetite change, chills and fever.   HENT: Negative for congestion, ear pain, rhinorrhea and sore throat.    Respiratory: Negative for cough and shortness of breath.    Cardiovascular: Negative for chest pain, palpitations and leg swelling.   Gastrointestinal: Negative for abdominal pain, constipation, diarrhea, nausea and vomiting.   Musculoskeletal: Negative for arthralgias and myalgias.   Neurological: Negative for headache.        Objective     /66 (BP Location: Left arm, Patient Position: Sitting)   Pulse 83   Ht 160 cm (63\")   Wt 121 kg (266 lb 12.8 oz)   SpO2 95% Comment: on room air  BMI 47.26 kg/m²       Physical Exam  Vitals and nursing note reviewed.   Constitutional:       General: She is not in acute distress.     Appearance: Normal appearance.   Cardiovascular:      Rate and Rhythm: Normal rate and regular rhythm.      Heart sounds: Normal heart sounds. No murmur heard.  Pulmonary:      Effort: Pulmonary effort is normal.      Breath sounds: Normal breath sounds.   Abdominal:      Palpations: Abdomen is soft.      Tenderness: There is no abdominal tenderness.   Musculoskeletal:      Cervical back: Neck supple.      Right lower leg: No edema.      Left lower leg: No edema.   Lymphadenopathy:      Cervical: No cervical adenopathy.   Neurological:      General: No focal deficit present.      Mental Status: She is alert.      Cranial Nerves: No cranial nerve deficit.      Coordination: Coordination normal.      Gait: Gait normal.   Psychiatric:         Mood and Affect: Mood normal.         Behavior: Behavior normal.         Result Review :                             Assessment and Plan      Diagnoses and all orders for this visit:    1. Acute on chronic congestive heart " failure, unspecified heart failure type (HCC) (Primary)  Assessment & Plan:  IMPROVED CURRENTLY, PLANS PER CARDIOLOGY       2. Depression with anxiety  Assessment & Plan:  Patient's depression is single episode and is moderate without psychosis. Their depression is currently active and the condition is worsening. This will be reassessed at the next regular appointment. F/U as described:patient will continue current medication therapy and patient was prescribed an antidepressant medicine   WILL INCREASE THE ZOLOFT AS NOTED .    Orders:  -     sertraline (ZOLOFT) 100 MG tablet; Take 1 tablet by mouth Daily.  Dispense: 90 tablet; Refill: 1    3. Essential hypertension  Assessment & Plan:  Hypertension is improving with treatment.  Continue current treatment regimen.  Continue current medications.  Blood pressure will be reassessed at the next regular appointment.      4. Health counseling  Comments:  DESPITE RECOMMENDATIONS TO THE CONTRARY SHE DECLINES A SCREENING MAMMOGRAM AND A SCREENING COLONOSCOPY           Follow Up     Return in about 6 months (around 7/12/2023).    Patient was given instructions and counseling regarding her condition or for health maintenance advice. Please see specific information pulled into the AVS if appropriate.

## 2023-01-24 ENCOUNTER — HOSPITAL ENCOUNTER (OUTPATIENT)
Dept: GENERAL RADIOLOGY | Facility: HOSPITAL | Age: 53
Discharge: HOME | End: 2023-01-24
Admitting: NURSE PRACTITIONER
Payer: COMMERCIAL

## 2023-01-24 ENCOUNTER — OFFICE VISIT (OUTPATIENT)
Dept: FAMILY MEDICINE CLINIC | Age: 53
End: 2023-01-24
Payer: COMMERCIAL

## 2023-01-24 VITALS
HEIGHT: 63 IN | DIASTOLIC BLOOD PRESSURE: 82 MMHG | BODY MASS INDEX: 47.13 KG/M2 | SYSTOLIC BLOOD PRESSURE: 118 MMHG | OXYGEN SATURATION: 94 % | WEIGHT: 266 LBS | TEMPERATURE: 98 F | HEART RATE: 75 BPM

## 2023-01-24 DIAGNOSIS — Z23 NEED FOR VACCINATION: Primary | ICD-10-CM

## 2023-01-24 DIAGNOSIS — M54.2 NECK PAIN: ICD-10-CM

## 2023-01-24 PROCEDURE — 91312 COVID-19 (PFIZER) BIVALENT BOOSTER 12+YRS: CPT | Performed by: NURSE PRACTITIONER

## 2023-01-24 PROCEDURE — 72040 X-RAY EXAM NECK SPINE 2-3 VW: CPT

## 2023-01-24 PROCEDURE — 99213 OFFICE O/P EST LOW 20 MIN: CPT | Performed by: NURSE PRACTITIONER

## 2023-01-24 PROCEDURE — 0124A COVID-19 (PFIZER) BIVALENT BOOSTER 12+YRS: CPT | Performed by: NURSE PRACTITIONER

## 2023-01-24 RX ORDER — CYCLOBENZAPRINE HCL 10 MG
10 TABLET ORAL 3 TIMES DAILY PRN
Qty: 30 TABLET | Refills: 0 | Status: SHIPPED | OUTPATIENT
Start: 2023-01-24

## 2023-01-24 RX ORDER — METHYLPREDNISOLONE 4 MG/1
TABLET ORAL
Qty: 21 TABLET | Refills: 0 | Status: SHIPPED | OUTPATIENT
Start: 2023-01-24

## 2023-01-24 RX ORDER — DAPAGLIFLOZIN 10 MG/1
1 TABLET, FILM COATED ORAL DAILY
COMMUNITY

## 2023-01-24 NOTE — PROGRESS NOTES
Ashely Moore presents to [facility].    Chief Complaint:  Pain (Neck pain going down into shoulders, 1+ week )         History of Present Illness:  Joint pain:   Neck pain pain and stiffness and muscle pain, spasms (worse with movement) no injury, just woke up with pain across mid neck to bilateral shoulders   Symptoms started:one week ago   associated symptoms:radiating pain to shoulders  Treatment tried:heat, ice, biofreeze, tylenol, help some  Dg testing : none     PMH: changes since :      Saw neph recently    Hospitalized:     CHF   21 for her CHF,     Ablation and New pacemaker 21           Sleep Apnea: dx'd in 2019; (+) sleep study; uses CPAP;     CHF sees cardiology       GYNECOLOGICAL HISTORY:     miscarriage ONE    Last mammogram was          CURRENT MEDICAL PROVIDERS:    Cardiologist: Kobe /Christian /[facility]         Surgical History:     Other Surgeries    Dilation and Curettage    DEFIBRILLATOR PLACED;    right shoulder manipulatioin 2020;     Procedures: cardiac ablation          Family History:         Positive for Coronary Artery Disease and Cardiomyopathy--brother (Jocelyn Lopez) S/P transplant.      Positive for Parkinson's Disease.  Father:  at age 54; Cause of death was cardiomyopathy     Mother: Anxiety     Brother(s): 3 brother(s) total; 1 ;  cardiomyopathy     Sister(s): 4 sister(s) total         Social History:     Occupation: Disabled (due to heart related )     Marital Status:      Children: None             Review of Systems:  Review of Systems   Constitutional: Negative for fatigue and fever.   Respiratory: Negative for cough and shortness of breath.    Cardiovascular: Negative for chest pain, palpitations and leg swelling.   Neurological: Negative for numbness.          Current Outpatient Medications:   •  aspirin 81 MG EC tablet, Take 81 mg by mouth Every Morning. Pt told by  "cardiologist to do what surgeon says and pt called surgeon and he said to stop day before so last dose will be 3/21/2021, Disp: , Rfl:   •  bumetanide (BUMEX) 2 MG tablet, Take 2 mg by mouth 2 (two) times a day., Disp: , Rfl:   •  dapagliflozin Propanediol (Farxiga) 10 MG tablet, Take 1 tablet by mouth Daily., Disp: , Rfl:   •  metoprolol succinate XL (TOPROL-XL) 50 MG 24 hr tablet, Take 50 mg by mouth 2 (two) times a day., Disp: , Rfl:   •  potassium chloride (KLOR-CON) 20 MEQ packet, Take 20 mEq by mouth 2 (Two) Times a Day., Disp: , Rfl:   •  sacubitril-valsartan (ENTRESTO) 49-51 MG tablet, Take 1 tablet by mouth 2 (Two) Times a Day., Disp: , Rfl:   •  sertraline (ZOLOFT) 100 MG tablet, Take 1 tablet by mouth Daily., Disp: 90 tablet, Rfl: 1  •  spironolactone (ALDACTONE) 25 MG tablet, Take 12.5 mg by mouth Every Night., Disp: , Rfl:   •  cyclobenzaprine (FLEXERIL) 10 MG tablet, Take 1 tablet by mouth 3 (Three) Times a Day As Needed for Muscle Spasms., Disp: 30 tablet, Rfl: 0  •  methylPREDNISolone (MEDROL) 4 MG dose pack, Take as directed on package instructions, with food., Disp: 21 tablet, Rfl: 0    Vital Signs:   Vitals:    01/24/23 1056   BP: 118/82   BP Location: Left arm   Patient Position: Sitting   Pulse: 75   Temp: 98 °F (36.7 °C)   SpO2: 94%   Weight: 121 kg (266 lb)   Height: 160 cm (62.99\")   PainSc:   7   PainLoc: Neck         Physical Exam:  Physical Exam  Vitals reviewed.   Constitutional:       General: She is not in acute distress.     Appearance: Normal appearance. She is obese.   Neck:      Vascular: No carotid bruit.   Cardiovascular:      Rate and Rhythm: Normal rate and regular rhythm.      Heart sounds: Normal heart sounds. No murmur heard.  Pulmonary:      Effort: Pulmonary effort is normal. No respiratory distress.      Breath sounds: Normal breath sounds.   Musculoskeletal:         General: No tenderness.      Comments: Pain with ROM of neck, worse with extension of neck    Neurological: "      Mental Status: She is alert.   Psychiatric:         Mood and Affect: Mood normal.         Behavior: Behavior normal.         Result Review      The following data was reviewed by: APRYL Barrow on 01/24/2023:    Results for orders placed or performed during the hospital encounter of 03/23/21   Basic Metabolic Panel    Specimen: Arm, Right; Blood   Result Value Ref Range    Glucose 111 (H) 65 - 99 mg/dL    BUN 21 (H) 6 - 20 mg/dL    Creatinine 0.90 0.57 - 1.00 mg/dL    Sodium 141 136 - 145 mmol/L    Potassium 4.0 3.5 - 5.2 mmol/L    Chloride 103 98 - 107 mmol/L    CO2 26.7 22.0 - 29.0 mmol/L    Calcium 9.3 8.6 - 10.5 mg/dL    eGFR Non African Amer 66 >60 mL/min/1.73    BUN/Creatinine Ratio 23.3 7.0 - 25.0    Anion Gap 11.3 5.0 - 15.0 mmol/L               Assessment and Plan:          Diagnoses and all orders for this visit:    1. Need for vaccination (Primary)  Assessment & Plan:  Patient wants to get her covid booster today, declines flu vaccine     Orders:  -     COVID-19 Bivalent Booster (Pfizer) 12+yrs    2. Neck pain  Assessment & Plan:  Sending to x-ray for a c spine; will give her a trial of steroid pack and muscle relaxer, cautioned on sedative effect, continue to use heat, try massage     Orders:  -     XR Spine Cervical 2 or 3 View; Future  -     cyclobenzaprine (FLEXERIL) 10 MG tablet; Take 1 tablet by mouth 3 (Three) Times a Day As Needed for Muscle Spasms.  Dispense: 30 tablet; Refill: 0  -     methylPREDNISolone (MEDROL) 4 MG dose pack; Take as directed on package instructions, with food.  Dispense: 21 tablet; Refill: 0        Follow Up   Return if symptoms worsen or fail to improve, for follow up pending x-ray result.  Patient was given instructions and counseling regarding her condition or for health maintenance advice. Please see specific information pulled into the AVS if appropriate.

## 2023-01-24 NOTE — ASSESSMENT & PLAN NOTE
Sending to x-ray for a c spine; will give her a trial of steroid pack and muscle relaxer, cautioned on sedative effect, continue to use heat, try massage

## 2023-03-14 ENCOUNTER — TRANSCRIBE ORDERS (OUTPATIENT)
Dept: ADMINISTRATIVE | Facility: HOSPITAL | Age: 53
End: 2023-03-14
Payer: MEDICARE

## 2023-03-14 ENCOUNTER — LAB (OUTPATIENT)
Dept: LAB | Facility: HOSPITAL | Age: 53
End: 2023-03-14
Payer: COMMERCIAL

## 2023-03-14 DIAGNOSIS — E11.9 DIABETES MELLITUS WITHOUT COMPLICATION: ICD-10-CM

## 2023-03-14 DIAGNOSIS — E66.01 MORBID OBESITY: ICD-10-CM

## 2023-03-14 DIAGNOSIS — E11.9 DIABETES MELLITUS WITHOUT COMPLICATION: Primary | ICD-10-CM

## 2023-03-14 DIAGNOSIS — I50.9 HEART FAILURE, UNSPECIFIED HF CHRONICITY, UNSPECIFIED HEART FAILURE TYPE: ICD-10-CM

## 2023-03-14 DIAGNOSIS — R06.00 DYSPNEA, UNSPECIFIED TYPE: ICD-10-CM

## 2023-03-14 LAB — HBA1C MFR BLD: 6.4 % (ref 4.8–5.6)

## 2023-03-14 PROCEDURE — 83036 HEMOGLOBIN GLYCOSYLATED A1C: CPT

## 2023-03-14 PROCEDURE — 36415 COLL VENOUS BLD VENIPUNCTURE: CPT

## 2023-04-07 ENCOUNTER — READMISSION MANAGEMENT (OUTPATIENT)
Dept: CALL CENTER | Facility: HOSPITAL | Age: 53
End: 2023-04-07
Payer: MEDICARE

## 2023-04-07 ENCOUNTER — TELEPHONE (OUTPATIENT)
Dept: FAMILY MEDICINE CLINIC | Age: 53
End: 2023-04-07

## 2023-04-07 NOTE — TELEPHONE ENCOUNTER
Caller: Ashely Moore    Relationship to patient: Self    Best call back number: [phone]    New or established patient?  [] New  [x] Established    Date of discharge: 4/7/23    Facility discharged from: [facility]    Diagnosis/Symptoms: ACUTE CHRONIC HEART FAILURE      PATIENT HAS A HOSPITAL FOLLOW UP APPOINTMENT NEXT WEEK.

## 2023-04-07 NOTE — OUTREACH NOTE
Prep Survey    Flowsheet Row Responses   [facility] facility patient discharged from? Non-BH   Is LACE score < 7 ? Non-BH Discharge   Eligibility [facility]   Date of Discharge 04/07/23   Discharge diagnosis CHF   Does the patient have one of the following disease processes/diagnoses(primary or secondary)? CHF   Prep survey completed? Yes          Adwoa BURTON - Registered Nurse

## 2023-04-08 ENCOUNTER — TRANSITIONAL CARE MANAGEMENT TELEPHONE ENCOUNTER (OUTPATIENT)
Dept: CALL CENTER | Facility: HOSPITAL | Age: 53
End: 2023-04-08
Payer: MEDICARE

## 2023-04-08 NOTE — OUTREACH NOTE
Call Center TCM Note    Flowsheet Row Responses   [facility] patient discharged from? Non-   Does the patient have one of the following disease processes/diagnoses(primary or secondary)? Other   TCM attempt successful? Yes   Call start time 1103   Call end time 1106   Discharge diagnosis CHF   Is patient permission given to speak with other caregiver? Yes   Person spoke with today (if not patient) and relationship Az Luis reviewed with patient/caregiver? Yes   Is the patient having any side effects they believe may be caused by any medication additions or changes? No   Does the patient have all medications ordered at discharge? Yes   Is the patient taking all medications as directed (includes completed medication regime)? Yes   Comments Hospital f/u appt is 04/12/23 @ 130. TCM complete.   Does the patient have an appointment with their PCP within 7 days of discharge? Yes   Has home health visited the patient within 72 hours of discharge? N/A   Psychosocial issues? No   Did the patient receive a copy of their discharge instructions? Yes   Nursing interventions Reviewed instructions with patient   What is the patient's perception of their health status since discharge? Improving   Is the patient/caregiver able to teach back signs and symptoms related to disease process for when to call PCP? Yes   Is the patient/caregiver able to teach back signs and symptoms related to disease process for when to call 911? Yes   Is the patient/caregiver able to teach back the hierarchy of who to call/visit for symptoms/problems? PCP, Specialist, Home health nurse, Urgent Care, ED, 911 Yes   If the patient is a current smoker, are they able to teach back resources for cessation? Not a smoker   Additional teach back comments They do avoid salt. she weighs daily.    TCM call completed? Yes   Wrap up additional comments She is home and doing well, no swelling.   Call end time 1106   Would this patient benefit from a  Referral to [facility] Social [facility]? No   Is the patient interested in additional calls from an ambulatory ?  NOTE:  applies to high risk patients requiring additional follow-up. No          Elizabeth Damian RN    4/8/2023, 11:07 EDT

## 2023-04-12 ENCOUNTER — OFFICE VISIT (OUTPATIENT)
Dept: FAMILY MEDICINE CLINIC | Age: 53
End: 2023-04-12
Payer: MEDICARE

## 2023-04-12 VITALS
DIASTOLIC BLOOD PRESSURE: 64 MMHG | WEIGHT: 257.6 LBS | HEART RATE: 71 BPM | HEIGHT: 63 IN | BODY MASS INDEX: 45.64 KG/M2 | OXYGEN SATURATION: 95 % | SYSTOLIC BLOOD PRESSURE: 100 MMHG

## 2023-04-12 DIAGNOSIS — I42.0 NONISCHEMIC CONGESTIVE CARDIOMYOPATHY: ICD-10-CM

## 2023-04-12 DIAGNOSIS — I10 ESSENTIAL HYPERTENSION: ICD-10-CM

## 2023-04-12 DIAGNOSIS — I50.9 ACUTE ON CHRONIC CONGESTIVE HEART FAILURE, UNSPECIFIED HEART FAILURE TYPE: Primary | ICD-10-CM

## 2023-04-12 DIAGNOSIS — F41.8 DEPRESSION WITH ANXIETY: ICD-10-CM

## 2023-04-12 PROBLEM — M54.2 NECK PAIN: Status: RESOLVED | Noted: 2023-01-24 | Resolved: 2023-04-12

## 2023-04-12 PROBLEM — Z23 NEED FOR VACCINATION: Status: RESOLVED | Noted: 2023-01-24 | Resolved: 2023-04-12

## 2023-04-12 RX ORDER — SERTRALINE HYDROCHLORIDE 100 MG/1
100 TABLET, FILM COATED ORAL DAILY
COMMUNITY
End: 2023-04-12

## 2023-04-12 NOTE — ASSESSMENT & PLAN NOTE
Congestive heart failure due to CARDIOMYOPATHY.  Heart failure is improving with treatment.  NYHA Class III.  Continue current treatment regimen.  Heart failure will be reassessed in 6 months   HOSPITAL NOTES REVIEWED, CONTINUE I.V. INOTROPE.  FURTHER PLANS PER CARDIOLOGY

## 2023-04-12 NOTE — ASSESSMENT & PLAN NOTE
Hypertension is improving with treatment.  Continue current treatment regimen.  Continue current medications.  Blood pressure will be reassessed at the next regular appointment .  POSSIBLY OVER TREATED, PLANS PER CARDIOLOGY

## 2023-04-12 NOTE — PROGRESS NOTES
Transitional Care Follow Up Visit  Subjective     Ashely Moore is a 52 y.o. female who presents for a transitional care management visit.    Within 48 business hours after discharge our office contacted her via telephone to coordinate her care and needs.      I reviewed and discussed the details of that call along with the discharge summary, hospital problems, inpatient lab results, inpatient diagnostic studies, and consultation reports with Ashely.     Current outpatient and discharge medications have been reconciled for the patient.  Reviewed by: Sergio Ribeiro MD          4/7/2023     1:40 PM   Date of TCM Phone Call   [facility]   Date of Discharge 4/7/2023     Risk for Readmission (LACE) No data recorded    History of Present Illness  --TOLERATING BLOOD PRESSURE MEDICATION WITHOUT APPARENT SIDE EFFECTS  --THE DEPRESSION IS IMPROVED WITH THE HIGHER DOSE OF THE ZOLOFT  --HAS A CARDIOMYOPATHY WITH SUBSEQUENT CHF.  WAS ADMITTED WITH FLUID OVERLOAD AND DISCHARGED ON AN I.V. INOTROE, MAY BE RECEIVING AN L-VAD.  FEELS AT BASELINE NOW      Course During Hospital Stay:  IMPROVED     The following portions of the patient's history were reviewed and updated as appropriate: allergies, current medications, past family history, past medical history, past social history, past surgical history and problem list.    Review of Systems   Constitutional: Negative for chills, fatigue and fever.   HENT: Negative for congestion, ear pain and rhinorrhea.    Respiratory: Negative for cough and shortness of breath.    Cardiovascular: Negative for chest pain, palpitations and leg swelling.   Gastrointestinal: Negative for abdominal pain, nausea and vomiting.   Musculoskeletal: Negative for arthralgias and myalgias.   Neurological: Negative for headaches.       Objective   Physical Exam  Vitals and nursing note reviewed.   Constitutional:       General: She is not in acute distress.      Appearance: Normal appearance.   Cardiovascular:      Rate and Rhythm: Normal rate and regular rhythm.      Heart sounds: Normal heart sounds. No murmur heard.  Pulmonary:      Effort: Pulmonary effort is normal.      Breath sounds: Normal breath sounds.   Abdominal:      Palpations: Abdomen is soft.      Tenderness: There is no abdominal tenderness.   Musculoskeletal:      Cervical back: Neck supple.      Right lower leg: No edema.      Left lower leg: No edema.   Lymphadenopathy:      Cervical: No cervical adenopathy.   Neurological:      General: No focal deficit present.      Mental Status: She is alert.      Cranial Nerves: No cranial nerve deficit.      Coordination: Coordination normal.      Gait: Gait normal.   Psychiatric:         Mood and Affect: Mood normal.         Behavior: Behavior normal.         Assessment & Plan   Diagnoses and all orders for this visit:    1. Acute on chronic congestive heart failure, unspecified heart failure type (Primary)  Assessment & Plan:  Congestive heart failure due to CARDIOMYOPATHY.  Heart failure is improving with treatment.  NYHA Class III.  Continue current treatment regimen.  Heart failure will be reassessed in 6 months   HOSPITAL NOTES REVIEWED, CONTINUE I.V. INOTROPE.  FURTHER PLANS PER CARDIOLOGY       2. Depression with anxiety  Assessment & Plan:  Patient's depression is single episode and is moderate without psychosis. Their depression is currently in partial remission and the condition is improving with treatment. This will be reassessed at the next regular appointment. F/U as described:patient will continue current medication therapy.      3. Essential hypertension  Assessment & Plan:  Hypertension is improving with treatment.  Continue current treatment regimen.  Continue current medications.  Blood pressure will be reassessed at the next regular appointment .  POSSIBLY OVER TREATED, PLANS PER CARDIOLOGY       4. Nonischemic congestive  cardiomyopathy  Assessment & Plan:  MAY RECEIVE AN L-VAD

## 2023-05-25 ENCOUNTER — READMISSION MANAGEMENT (OUTPATIENT)
Dept: CALL CENTER | Facility: HOSPITAL | Age: 53
End: 2023-05-25
Payer: MEDICARE

## 2023-05-26 ENCOUNTER — TRANSITIONAL CARE MANAGEMENT TELEPHONE ENCOUNTER (OUTPATIENT)
Dept: CALL CENTER | Facility: HOSPITAL | Age: 53
End: 2023-05-26
Payer: MEDICARE

## 2023-05-26 NOTE — OUTREACH NOTE
Prep Survey    Flowsheet Row Responses   [facility] facility patient discharged from? Non-BH   Is LACE score < 7 ? Non-BH Discharge   Eligibility [facility]   [facility]   Date of Admission 04/28/23   Date of Discharge 06/25/23   Discharge Disposition Home or Self Care   Discharge diagnosis S/P LVAD, MARLENA Occlusion, Bill sternal plate closure   Does the patient have one of the following disease processes/diagnoses(primary or secondary)? Cardiothoracic surgery   Prep survey completed? Yes          Zenaida CASH - Registered Nurse

## 2023-05-26 NOTE — OUTREACH NOTE
Call Center TCM Note    Flowsheet Row Responses   [facility] facility patient discharged from? Non-BH   Does the patient have one of the following disease processes/diagnoses(primary or secondary)? Cardiothoracic surgery   TCM attempt successful? Yes   Call start time 0828   Change in Health Status Moved to LTC/SNF/Hospice  [[facility]   Person spoke with today (if not patient) and relationship spouse   TCM call completed? Yes          Stephani Decker RN    5/26/2023, 08:29 EDT

## 2023-07-31 ENCOUNTER — TRANSCRIBE ORDERS (OUTPATIENT)
Dept: ADMINISTRATIVE | Facility: HOSPITAL | Age: 53
End: 2023-07-31
Payer: COMMERCIAL

## 2023-07-31 ENCOUNTER — LAB (OUTPATIENT)
Dept: LAB | Facility: HOSPITAL | Age: 53
End: 2023-07-31
Payer: COMMERCIAL

## 2023-07-31 DIAGNOSIS — E55.9 VITAMIN D DEFICIENCY: ICD-10-CM

## 2023-07-31 DIAGNOSIS — R60.9 EDEMA, UNSPECIFIED TYPE: ICD-10-CM

## 2023-07-31 DIAGNOSIS — I10 HYPERTENSION, ESSENTIAL: ICD-10-CM

## 2023-07-31 DIAGNOSIS — N18.2 CHRONIC KIDNEY DISEASE, STAGE II (MILD): Primary | ICD-10-CM

## 2023-07-31 DIAGNOSIS — N18.2 CHRONIC KIDNEY DISEASE, STAGE II (MILD): ICD-10-CM

## 2023-07-31 LAB
25(OH)D3 SERPL-MCNC: 15.6 NG/ML (ref 30–100)
ANION GAP SERPL CALCULATED.3IONS-SCNC: 17.1 MMOL/L (ref 5–15)
BUN SERPL-MCNC: 16 MG/DL (ref 6–20)
BUN/CREAT SERPL: 13.1 (ref 7–25)
CALCIUM SPEC-SCNC: 10.1 MG/DL (ref 8.6–10.5)
CHLORIDE SERPL-SCNC: 99 MMOL/L (ref 98–107)
CO2 SERPL-SCNC: 22.9 MMOL/L (ref 22–29)
CREAT SERPL-MCNC: 1.22 MG/DL (ref 0.57–1)
DEPRECATED RDW RBC AUTO: 55.7 FL (ref 37–54)
EGFRCR SERPLBLD CKD-EPI 2021: 53.2 ML/MIN/1.73
ERYTHROCYTE [DISTWIDTH] IN BLOOD BY AUTOMATED COUNT: 17.2 % (ref 12.3–15.4)
GLUCOSE SERPL-MCNC: 98 MG/DL (ref 65–99)
HCT VFR BLD AUTO: 42 % (ref 34–46.6)
HGB BLD-MCNC: 13.1 G/DL (ref 12–15.9)
MCH RBC QN AUTO: 27.3 PG (ref 26.6–33)
MCHC RBC AUTO-ENTMCNC: 31.2 G/DL (ref 31.5–35.7)
MCV RBC AUTO: 87.7 FL (ref 79–97)
PHOSPHATE SERPL-MCNC: 4 MG/DL (ref 2.5–4.5)
PLATELET # BLD AUTO: 249 10*3/MM3 (ref 140–450)
PMV BLD AUTO: 9.6 FL (ref 6–12)
POTASSIUM SERPL-SCNC: 4.2 MMOL/L (ref 3.5–5.2)
RBC # BLD AUTO: 4.79 10*6/MM3 (ref 3.77–5.28)
SODIUM SERPL-SCNC: 139 MMOL/L (ref 136–145)
WBC NRBC COR # BLD: 7.36 10*3/MM3 (ref 3.4–10.8)

## 2023-07-31 PROCEDURE — 84100 ASSAY OF PHOSPHORUS: CPT

## 2023-07-31 PROCEDURE — 82306 VITAMIN D 25 HYDROXY: CPT

## 2023-07-31 PROCEDURE — 36415 COLL VENOUS BLD VENIPUNCTURE: CPT

## 2023-07-31 PROCEDURE — 85027 COMPLETE CBC AUTOMATED: CPT

## 2023-07-31 PROCEDURE — 83970 ASSAY OF PARATHORMONE: CPT

## 2023-07-31 PROCEDURE — 80048 BASIC METABOLIC PNL TOTAL CA: CPT

## 2023-08-01 ENCOUNTER — LAB (OUTPATIENT)
Dept: LAB | Facility: HOSPITAL | Age: 53
End: 2023-08-01
Payer: COMMERCIAL

## 2023-08-01 DIAGNOSIS — R60.9 EDEMA, UNSPECIFIED TYPE: ICD-10-CM

## 2023-08-01 DIAGNOSIS — E55.9 VITAMIN D DEFICIENCY: ICD-10-CM

## 2023-08-01 DIAGNOSIS — I10 HYPERTENSION, ESSENTIAL: ICD-10-CM

## 2023-08-01 DIAGNOSIS — N18.2 CHRONIC KIDNEY DISEASE, STAGE II (MILD): ICD-10-CM

## 2023-08-01 LAB
BACTERIA UR QL AUTO: ABNORMAL /HPF
BILIRUB UR QL STRIP: NEGATIVE
CLARITY UR: CLEAR
COLOR UR: YELLOW
CREAT UR-MCNC: 50 MG/DL
GLUCOSE UR STRIP-MCNC: ABNORMAL MG/DL
HGB UR QL STRIP.AUTO: NEGATIVE
HYALINE CASTS UR QL AUTO: ABNORMAL /LPF
KETONES UR QL STRIP: NEGATIVE
LEUKOCYTE ESTERASE UR QL STRIP.AUTO: ABNORMAL
NITRITE UR QL STRIP: NEGATIVE
PH UR STRIP.AUTO: 7 [PH] (ref 5–8)
PROT ?TM UR-MCNC: 6.3 MG/DL
PROT UR QL STRIP: NEGATIVE
PROT/CREAT UR: 0.13 MG/G{CREAT}
PTH-INTACT SERPL-MCNC: 88.9 PG/ML (ref 15–65)
RBC # UR STRIP: ABNORMAL /HPF
REF LAB TEST METHOD: ABNORMAL
SP GR UR STRIP: 1.01 (ref 1–1.03)
SQUAMOUS #/AREA URNS HPF: ABNORMAL /HPF
UROBILINOGEN UR QL STRIP: ABNORMAL
WBC # UR STRIP: ABNORMAL /HPF

## 2023-08-01 PROCEDURE — 81001 URINALYSIS AUTO W/SCOPE: CPT

## 2023-08-01 PROCEDURE — 84156 ASSAY OF PROTEIN URINE: CPT

## 2023-08-01 PROCEDURE — 82570 ASSAY OF URINE CREATININE: CPT

## 2023-08-08 ENCOUNTER — TRANSCRIBE ORDERS (OUTPATIENT)
Dept: LAB | Facility: HOSPITAL | Age: 53
End: 2023-08-08
Payer: COMMERCIAL

## 2023-08-08 ENCOUNTER — LAB (OUTPATIENT)
Dept: LAB | Facility: HOSPITAL | Age: 53
End: 2023-08-08
Payer: COMMERCIAL

## 2023-08-08 DIAGNOSIS — Z95.811 LVAD (LEFT VENTRICULAR ASSIST DEVICE) PRESENT: ICD-10-CM

## 2023-08-08 DIAGNOSIS — Z95.811 LVAD (LEFT VENTRICULAR ASSIST DEVICE) PRESENT: Primary | ICD-10-CM

## 2023-08-08 PROCEDURE — 83880 ASSAY OF NATRIURETIC PEPTIDE: CPT

## 2023-08-08 PROCEDURE — 36415 COLL VENOUS BLD VENIPUNCTURE: CPT

## 2023-08-08 PROCEDURE — 80048 BASIC METABOLIC PNL TOTAL CA: CPT

## 2023-08-09 LAB
ANION GAP SERPL CALCULATED.3IONS-SCNC: 15.4 MMOL/L (ref 5–15)
BUN SERPL-MCNC: 18 MG/DL (ref 6–20)
BUN/CREAT SERPL: 14.6 (ref 7–25)
CALCIUM SPEC-SCNC: 10.1 MG/DL (ref 8.6–10.5)
CHLORIDE SERPL-SCNC: 99 MMOL/L (ref 98–107)
CO2 SERPL-SCNC: 23.6 MMOL/L (ref 22–29)
CREAT SERPL-MCNC: 1.23 MG/DL (ref 0.57–1)
EGFRCR SERPLBLD CKD-EPI 2021: 52.7 ML/MIN/1.73
GLUCOSE SERPL-MCNC: 94 MG/DL (ref 65–99)
NT-PROBNP SERPL-MCNC: 171 PG/ML (ref 0–900)
POTASSIUM SERPL-SCNC: 4.4 MMOL/L (ref 3.5–5.2)
SODIUM SERPL-SCNC: 138 MMOL/L (ref 136–145)

## 2023-08-28 ENCOUNTER — OFFICE VISIT (OUTPATIENT)
Dept: FAMILY MEDICINE CLINIC | Age: 53
End: 2023-08-28
Payer: COMMERCIAL

## 2023-08-28 VITALS — WEIGHT: 234.6 LBS | BODY MASS INDEX: 41.57 KG/M2 | HEIGHT: 63 IN | TEMPERATURE: 98.1 F

## 2023-08-28 DIAGNOSIS — Z00.00 MEDICARE ANNUAL WELLNESS VISIT, SUBSEQUENT: Primary | ICD-10-CM

## 2023-08-28 DIAGNOSIS — Z95.811 LVAD (LEFT VENTRICULAR ASSIST DEVICE) PRESENT: ICD-10-CM

## 2023-08-28 DIAGNOSIS — I10 ESSENTIAL HYPERTENSION: ICD-10-CM

## 2023-08-28 DIAGNOSIS — F34.1 DYSTHYMIC DISORDER: ICD-10-CM

## 2023-08-28 PROBLEM — I50.9 CONGESTIVE HEART FAILURE: Status: RESOLVED | Noted: 2021-11-09 | Resolved: 2023-08-28

## 2023-08-28 PROBLEM — E78.00 HIGH CHOLESTEROL: Status: ACTIVE | Noted: 2023-08-28

## 2023-08-28 RX ORDER — APIXABAN 2.5 MG/1
TABLET, FILM COATED ORAL
COMMUNITY
Start: 2023-06-06

## 2023-08-28 RX ORDER — MAGNESIUM OXIDE TAB 400 MG (241.3 MG ELEMENTAL MG) 400 (241.3 MG) MG
TAB ORAL
COMMUNITY
Start: 2023-06-07

## 2023-08-28 RX ORDER — ATORVASTATIN CALCIUM 40 MG/1
40 TABLET, FILM COATED ORAL DAILY
COMMUNITY

## 2023-08-28 RX ORDER — DAPAGLIFLOZIN 10 MG/1
TABLET, FILM COATED ORAL
COMMUNITY
Start: 2023-06-28

## 2023-08-28 RX ORDER — SACUBITRIL AND VALSARTAN 97; 103 MG/1; MG/1
TABLET, FILM COATED ORAL
COMMUNITY
Start: 2023-06-06

## 2023-08-28 NOTE — ASSESSMENT & PLAN NOTE
ADVICE GIVEN RE:  SEATBELT USE, ALCOHOL USE, HEALTHY DIET, ROUTINE EYE AND DENTAL EXAM, ROUTINE VACCINATIONS.    WILL DEFER ROUTINE HEALTH MEASURES UNTIL AFTER FULL RECOVERY FROM THE L-VAD IMPLANTATION

## 2023-08-28 NOTE — PROGRESS NOTES
The ABCs of the Annual Wellness Visit  Subsequent Medicare Wellness Visit    Subjective    Ashely Moore is a 52 y.o. female who presents for a Subsequent Medicare Wellness Visit.    The following portions of the patient's history were reviewed and   updated as appropriate: allergies, current medications, past family history, past medical history, past social history, past surgical history, and problem list.    Compared to one year ago, the patient feels her physical   health is better.    Compared to one year ago, the patient feels her mental   health is the same.    Recent Hospitalizations:  She was admitted within the past 365 days at [facility].       Current Medical Providers:  Patient Care Team:  Sergio Ribeiro MD as PCP - General (Family Medicine)  Parth Bullock MD as Consulting Physician (Cardiology)  Jacob Benavides MD as Consulting Physician (Pulmonary Disease)  Az Harmon MD as Consulting Physician (Cardiac Electrophysiology)    Outpatient Medications Prior to Visit   Medication Sig Dispense Refill    aspirin 81 MG EC tablet Take 1 tablet by mouth Every Morning. Pt told by cardiologist to do what surgeon says and pt called surgeon and he said to stop day before so last dose will be 3/21/2021      atorvastatin (LIPITOR) 40 MG tablet Take 1 tablet by mouth Daily.      bumetanide (BUMEX) 2 MG tablet Take 1 tablet by mouth 2 (two) times a day.      Eliquis 2.5 MG tablet tablet as directed Orally      Entresto  MG tablet 1 Tab, Oral, Tab, BID, # 180 Tab, 5 Refill(s), Pharmacy: [facility] [id number], cm, 06/21/23 10:26:00 EDT, CLINICALHEIGHT, 106.48, kg, 06/21/23 10:26:00 EDT, CLINICALWEIGHT, 167.64      Farxiga 10 MG tablet       MAGnesium-Oxide 400 (240 Mg) MG tablet       potassium chloride (KLOR-CON) 20 MEQ packet Take 20 mEq by mouth 2 (Two) Times a Day.      sertraline (ZOLOFT) 100 MG tablet TAKE ONE TABLET BY MOUTH DAILY 90 tablet 1    spironolactone  "(ALDACTONE) 25 MG tablet Take 1 tablet by mouth Every Night.       No facility-administered medications prior to visit.       No opioid medication identified on active medication list. I have reviewed chart for other potential  high risk medication/s and harmful drug interactions in the elderly.        Aspirin is on active medication list. Aspirin use is indicated based on review of current medical condition/s. Pros and cons of this therapy have been discussed today. Benefits of this medication outweigh potential harm.  Patient has been encouraged to continue taking this medication.  .      Patient Active Problem List   Diagnosis    Dysthymic disorder    Essential hypertension    Automatic implantable cardiac defibrillator in situ    Nonischemic congestive cardiomyopathy    Obstructive sleep apnea (BIPAP)     Colonoscopy declined for now    Mammogram declined for now    High cholesterol    LVAD (left ventricular assist device) present    Medicare annual wellness visit, subsequent     Advance Care Planning   Advance Care Planning     Advance Directive is not on file.  ACP discussion was held with the patient during this visit. Patient has an advance directive (not in EMR), copy requested.     Objective    Vitals:    08/28/23 1551   BP: Comment: pt has LVAD   Temp: 98.1 øF (36.7 øC)   TempSrc: Oral   Weight: 106 kg (234 lb 9.6 oz)   Height: 160 cm (63\")     Estimated body mass index is 41.56 kg/mý as calculated from the following:    Height as of this encounter: 160 cm (63\").    Weight as of this encounter: 106 kg (234 lb 9.6 oz).    Class 3 Severe Obesity (BMI >=40). Obesity-related health conditions include the following: hypertension. Obesity is improving with treatment. BMI is is above average; BMI management plan is completed. We discussed portion control and increasing exercise.      Does the patient have evidence of cognitive impairment? No    Lab Results   Component Value Date    CHLPL 166 07/26/2023      "   HEALTH RISK ASSESSMENT    Smoking Status:  Social History     Tobacco Use   Smoking Status Former    Packs/day: 1.00    Years: 20.00    Pack years: 20.00    Types: Cigarettes    Quit date: 2014    Years since quittin.7    Passive exposure: Past   Smokeless Tobacco Never     Alcohol Consumption:  Social History     Substance and Sexual Activity   Alcohol Use Not Currently     Fall Risk Screen:    JACQUELINE Fall Risk Assessment was completed, and patient is at LOW risk for falls.Assessment completed on:2023    Depression Screenin/12/2023     1:23 PM   PHQ-2/PHQ-9 Depression Screening   Little Interest or Pleasure in Doing Things 1-->several days   Feeling Down, Depressed or Hopeless 0-->not at all   PHQ-9: Brief Depression Severity Measure Score 1       Health Habits and Functional and Cognitive Screenin/28/2023     3:55 PM   Functional & Cognitive Status   Do you have difficulty preparing food and eating? No   Do you have difficulty bathing yourself, getting dressed or grooming yourself? No   Do you have difficulty using the toilet? No   Do you have difficulty moving around from place to place? No   Do you have trouble with steps or getting out of a bed or a chair? No   Current Diet Well Balanced Diet   Dental Exam Not up to date   Eye Exam Not up to date   Exercise (times per week) 4 times per week   Current Exercises Include Walking   Do you need help using the phone?  No   Are you deaf or do you have serious difficulty hearing?  No   Do you need help to go to places out of walking distance? Yes   Do you need help shopping? Yes   Do you need help preparing meals?  No   Do you need help with housework?  No   Do you need help with laundry? Yes   Do you need help taking your medications? No   Do you need help managing money? No   Do you ever drive or ride in a car without wearing a seat belt? No   Have you felt unusual stress, anger or loneliness in the last month? No   Who do you live  with? Spouse   If you need help, do you have trouble finding someone available to you? No   Have you been bothered in the last four weeks by sexual problems? No   Do you have difficulty concentrating, remembering or making decisions? No       Age-appropriate Screening Schedule:  Refer to the list below for future screening recommendations based on patient's age, sex and/or medical conditions. Orders for these recommended tests are listed in the plan section. The patient has been provided with a written plan.    Health Maintenance   Topic Date Due    Hepatitis B (1 of 3 - 3-dose series) Never done    MAMMOGRAM  Never done    COLORECTAL CANCER SCREENING  Never done    Pneumococcal Vaccine 0-64 (1 - PCV) Never done    ZOSTER VACCINE (1 of 2) Never done    LUNG CANCER SCREENING  Never done    HEPATITIS C SCREENING  Never done    PAP SMEAR  Never done    TDAP/TD VACCINES (1 - Tdap) 01/12/2024 (Originally 12/18/1989)    INFLUENZA VACCINE  10/01/2023    HEMOGLOBIN A1C  01/26/2024    LIPID PANEL  07/26/2024    URINE MICROALBUMIN  08/01/2024    ANNUAL WELLNESS VISIT  08/28/2024    COVID-19 Vaccine  Completed    DIABETIC FOOT EXAM  Discontinued    DIABETIC EYE EXAM  Discontinued                  CMS Preventative Services Quick Reference  Risk Factors Identified During Encounter  None Identified  The above risks/problems have been discussed with the patient.  Pertinent information has been shared with the patient in the After Visit Summary.  An After Visit Summary and PPPS were made available to the patient.    Follow Up:   Next Medicare Wellness visit to be scheduled in 1 year.       Additional E&M Note during same encounter follows:  Patient has multiple medical problems which are significant and separately identifiable that require additional work above and beyond the Medicare Wellness Visit.      Chief Complaint  Medicare Wellness-subsequent    Subjective        --THE DEPRESSION IS STABLE ON THE SSRI  --TOLERATING BLOOD  "PRESSURE MEDICATION WITHOUT APPARENT SIDE EFFECTS  --HISTORY OF CARDIOMYOPATHY AND SUBSEQUENT CHF, S'/P RECENT IMPLANTATION OF AN L-VAD.  DOING OK POST-OP FO FAR, FOLLOWED BY CARDILOGY AND CT SURGERY.      Ashely Moore is also being seen today for DEPRESSION, BLOOD PRESSURE, CARDIOMYOPATHY    Review of Systems   Constitutional:  Positive for fatigue. Negative for activity change, appetite change, chills and fever.   HENT:  Negative for congestion, ear pain, hearing loss, rhinorrhea and sore throat.    Eyes:  Negative for discharge and visual disturbance.   Respiratory:  Negative for cough and shortness of breath.    Cardiovascular:  Negative for chest pain, palpitations and leg swelling.   Gastrointestinal:  Negative for abdominal pain, diarrhea, nausea and vomiting.   Genitourinary:  Negative for dysuria and hematuria.   Musculoskeletal:  Negative for arthralgias and myalgias.   Psychiatric/Behavioral:  Negative for dysphoric mood.      Objective   Vital Signs:  Temp 98.1 øF (36.7 øC) (Oral)   Ht 160 cm (63\")   Wt 106 kg (234 lb 9.6 oz)   BMI 41.56 kg/mý     Physical Exam  Vitals and nursing note reviewed.   Constitutional:       General: She is not in acute distress.     Appearance: Normal appearance.   HENT:      Right Ear: Tympanic membrane normal.      Left Ear: Tympanic membrane normal.      Mouth/Throat:      Pharynx: Oropharynx is clear.   Eyes:      Conjunctiva/sclera: Conjunctivae normal.   Cardiovascular:      Rate and Rhythm: Normal rate and regular rhythm.      Pulses:           Dorsalis pedis pulses are 2+ on the right side and 2+ on the left side.        Posterior tibial pulses are 2+ on the right side and 2+ on the left side.      Heart sounds: Normal heart sounds. No murmur heard.  Pulmonary:      Effort: Pulmonary effort is normal.      Breath sounds: Normal breath sounds.   Abdominal:      General: Bowel sounds are normal.      Palpations: Abdomen is soft.      Tenderness: There is no " abdominal tenderness.   Musculoskeletal:      Cervical back: Neck supple.      Right lower leg: No edema.      Left lower leg: No edema.      Right foot: Normal range of motion. No deformity, bunion, Charcot foot, foot drop or prominent metatarsal heads.      Left foot: Normal range of motion. No deformity, bunion, Charcot foot, foot drop or prominent metatarsal heads.   Feet:      Right foot:      Protective Sensation: 4 sites tested.  4 sites sensed.      Skin integrity: Skin integrity normal.      Toenail Condition: Right toenails are normal.      Left foot:      Protective Sensation: 4 sites tested.  4 sites sensed.      Skin integrity: Skin integrity normal.      Toenail Condition: Left toenails are normal.      Comments:      Lymphadenopathy:      Cervical: No cervical adenopathy.   Neurological:      General: No focal deficit present.      Mental Status: She is alert.      Cranial Nerves: No cranial nerve deficit.      Coordination: Coordination normal.      Gait: Gait normal.   Psychiatric:         Mood and Affect: Mood normal.         Behavior: Behavior normal.        The following data was reviewed by: Sergio Ribeiro MD on 08/28/2023:  Common labs          7/26/2023    12:24 7/31/2023    16:07 8/8/2023    16:35   Common Labs   Glucose  98  94    BUN  16  18    Creatinine  1.22  1.23    Sodium  139  138    Potassium  4.2  4.4    Chloride  99  99    Calcium  10.1  10.1    WBC  7.36     Hemoglobin  13.1     Hematocrit  42.0     Platelets  249     Total Cholesterol 166         Uric Acid 4.3            Details          This result is from an external source.                        Assessment and Plan   Diagnoses and all orders for this visit:    1. Medicare annual wellness visit, subsequent (Primary)  Assessment & Plan:  ADVICE GIVEN RE:  SEATBELT USE, ALCOHOL USE, HEALTHY DIET, ROUTINE EYE AND DENTAL EXAM, ROUTINE VACCINATIONS.    WILL DEFER ROUTINE HEALTH MEASURES UNTIL AFTER FULL RECOVERY FROM THE  L-VAD IMPLANTATION       2. Dysthymic disorder  Assessment & Plan:  Patient's depression is single episode and is moderate without psychosis. Their depression is currently in partial remission and the condition is improving with treatment. This will be reassessed at the next regular appointment. F/U as described:patient will continue current medication therapy.      3. LVAD (left ventricular assist device) present  Assessment & Plan:  IMPROVED WITH CURRENT TREATMENT, CONTINUE SAME, WILL REEVALUATE AT NEXT VISIT       4. Essential hypertension  Assessment & Plan:  Hypertension is improving with treatment.  Continue current treatment regimen.  Blood pressure will be reassessed at the next regular appointment.               Follow Up   Return in about 6 months (around 2/28/2024).  Patient was given instructions and counseling regarding her condition or for health maintenance advice. Please see specific information pulled into the AVS if appropriate.

## 2023-09-19 ENCOUNTER — TRANSCRIBE ORDERS (OUTPATIENT)
Dept: ADMINISTRATIVE | Facility: HOSPITAL | Age: 53
End: 2023-09-19
Payer: COMMERCIAL

## 2023-09-19 ENCOUNTER — LAB (OUTPATIENT)
Dept: LAB | Facility: HOSPITAL | Age: 53
End: 2023-09-19
Payer: COMMERCIAL

## 2023-09-19 DIAGNOSIS — I50.9: Primary | ICD-10-CM

## 2023-09-19 DIAGNOSIS — I50.9: ICD-10-CM

## 2023-09-19 LAB
ANION GAP SERPL CALCULATED.3IONS-SCNC: 14 MMOL/L (ref 5–15)
BUN SERPL-MCNC: 23 MG/DL (ref 6–20)
BUN/CREAT SERPL: 14.6 (ref 7–25)
CALCIUM SPEC-SCNC: 10.3 MG/DL (ref 8.6–10.5)
CHLORIDE SERPL-SCNC: 100 MMOL/L (ref 98–107)
CO2 SERPL-SCNC: 28 MMOL/L (ref 22–29)
CREAT SERPL-MCNC: 1.58 MG/DL (ref 0.57–1)
EGFRCR SERPLBLD CKD-EPI 2021: 39 ML/MIN/1.73
GLUCOSE SERPL-MCNC: 101 MG/DL (ref 65–99)
POTASSIUM SERPL-SCNC: 5 MMOL/L (ref 3.5–5.2)
SODIUM SERPL-SCNC: 142 MMOL/L (ref 136–145)

## 2023-09-19 PROCEDURE — 36415 COLL VENOUS BLD VENIPUNCTURE: CPT

## 2023-09-19 PROCEDURE — 80048 BASIC METABOLIC PNL TOTAL CA: CPT

## 2023-10-18 ENCOUNTER — OFFICE VISIT (OUTPATIENT)
Dept: CARDIAC REHAB | Facility: HOSPITAL | Age: 53
End: 2023-10-18
Payer: COMMERCIAL

## 2023-10-18 DIAGNOSIS — I50.20 SYSTOLIC CONGESTIVE HEART FAILURE, UNSPECIFIED HF CHRONICITY: Primary | ICD-10-CM

## 2023-10-18 PROCEDURE — 93798 PHYS/QHP OP CAR RHAB W/ECG: CPT

## 2023-10-18 RX ORDER — AMIODARONE HYDROCHLORIDE 200 MG/1
200 TABLET ORAL DAILY
COMMUNITY

## 2023-10-18 NOTE — PROGRESS NOTES
Chief Complaint  Cardiac Rehab Phase II    Ashely Moore presents to [facility] CARDIOPULMONARY REHABILITATION    Physical Exam  Constitutional:       Appearance: Normal appearance. She is obese.   Cardiovascular:      Rate and Rhythm: Normal rate.      Comments: LVAD present  Pulmonary:      Effort: Pulmonary effort is normal.      Breath sounds: Normal breath sounds.   Musculoskeletal:      Right lower leg: No edema.      Left lower leg: No edema.   Skin:     General: Skin is warm and dry.   Neurological:      Mental Status: She is alert.   Psychiatric:         Attention and Perception: Attention normal.         Mood and Affect: Mood and affect normal.         Behavior: Behavior normal. Behavior is cooperative.         Thought Content: Thought content normal.      Comments: PHQ9 score a 1. Pt denies anxiety or depression      Result Review :          Assessment and Plan    Diagnoses and all orders for this visit:    1. Systolic congestive heart failure, unspecified HF chronicity (Primary)        Antonieta Thompson RN

## 2023-10-18 NOTE — PROGRESS NOTES
Phase II and III Education    Discipline Teaching:  Cardiac Rehab Phase II [x]      Cardiac Rehab Phase III []      Pulmonary Rehab II []      Pulmonary Rehab III []      Peripheral Artery Disease []        Class Given:  Asthma Management []      Beginners Cardiac Rehab []      Beginners Pulmonary Rehab []      CAD I []      CAD II []      CHF []      Diabetes Mellitus []     Diet & Cholesterol []     Diet Consult []      Energy Conservation []     Exercise []     Grocery Store Tour []     Harmonica Therapy []     High Blood Pressure []     Living with COPD []     Medication Safety []     Peripheral Artery Disease []     S & S of Infection []      Stress []        Education Topics:  Angina []      Arrhythmias []      Asthma Management []      Beginning Cardiac Rehab [x]      Blood Pressure [x]     Blood Sugar []     Breathing Retrainment []     CHF [x]     Cooking []     Daily Weight [x]     Diabetes Mellitus []      Diet [x]     Energy Conservation []     Exercise [x]      Foot Care []      Grocery Store Tour []      Heart Disease [x]      Heart Function [x]      Incision Care []     Living with COPD []     Medication Safety []     PAD Symptoms/Treatment []     Reconditioning Exercises []     S & S Infection []     Smoking Consult []     Stress Management []     Test Results []       Teaching Aids:  Video [x]      PAD Handout []      Pulmonary Workbook []      CAD Teaching Packet [x]      Stress Teaching Packet []     Exercise Teaching Packet [x]      Diet Teaching Packet [x]      CHF Teaching Packet [x]      Blood Pressure Teaching Packet [x]      Smoking Cessation Packet []      Medication Safety Handout []      Pflex Training []      Diabetes Teaching Packet []      Harmonica Therapy Packet []        Teaching Method:  Discussion [x]      Written Information [x]      Audio Visual [x]      Demonstration []        Teaching Recipient:  Patient [x]      Family []      Friend []      Legal Guardian []      Primary  Care Giver []      Significant Other []        Barriers To Learning:  None [x]      Auditory []      Cultural []      Emotional []      Financial []      Language []    Mental []      Motivation []      Physical []      Reading Skills []      Moravian []      Verbal/Cognitive []      Visual []      Written/Cognitive []        Teaching Response:  Verified Via Teach back [x]      Return Demo Via Teach Back []      Reinforcement Needed []      Unable to Return Demo []      Unable to Comprehend []      Declines Teaching  []        Additional Education Topics:    Follow Up Instruction Comment:

## 2023-10-26 ENCOUNTER — TREATMENT (OUTPATIENT)
Dept: CARDIAC REHAB | Facility: HOSPITAL | Age: 53
End: 2023-10-26
Payer: COMMERCIAL

## 2023-10-26 DIAGNOSIS — I50.20 SYSTOLIC CONGESTIVE HEART FAILURE, UNSPECIFIED HF CHRONICITY: Primary | ICD-10-CM

## 2023-10-26 PROCEDURE — 93798 PHYS/QHP OP CAR RHAB W/ECG: CPT

## 2023-10-31 ENCOUNTER — TREATMENT (OUTPATIENT)
Dept: CARDIAC REHAB | Facility: HOSPITAL | Age: 53
End: 2023-10-31
Payer: COMMERCIAL

## 2023-10-31 DIAGNOSIS — I50.20 SYSTOLIC CONGESTIVE HEART FAILURE, UNSPECIFIED HF CHRONICITY: Primary | ICD-10-CM

## 2023-10-31 PROCEDURE — 93798 PHYS/QHP OP CAR RHAB W/ECG: CPT

## 2023-11-02 ENCOUNTER — TREATMENT (OUTPATIENT)
Dept: CARDIAC REHAB | Facility: HOSPITAL | Age: 53
End: 2023-11-02
Payer: COMMERCIAL

## 2023-11-02 DIAGNOSIS — I50.20 SYSTOLIC CONGESTIVE HEART FAILURE, UNSPECIFIED HF CHRONICITY: Primary | ICD-10-CM

## 2023-11-02 PROCEDURE — 93798 PHYS/QHP OP CAR RHAB W/ECG: CPT

## 2023-11-07 ENCOUNTER — TREATMENT (OUTPATIENT)
Dept: CARDIAC REHAB | Facility: HOSPITAL | Age: 53
End: 2023-11-07
Payer: COMMERCIAL

## 2023-11-07 DIAGNOSIS — I50.20 SYSTOLIC CONGESTIVE HEART FAILURE, UNSPECIFIED HF CHRONICITY: Primary | ICD-10-CM

## 2023-11-07 PROCEDURE — 93798 PHYS/QHP OP CAR RHAB W/ECG: CPT

## 2023-11-09 ENCOUNTER — TREATMENT (OUTPATIENT)
Dept: CARDIAC REHAB | Facility: HOSPITAL | Age: 53
End: 2023-11-09
Payer: COMMERCIAL

## 2023-11-09 DIAGNOSIS — I50.20 SYSTOLIC CONGESTIVE HEART FAILURE, UNSPECIFIED HF CHRONICITY: Primary | ICD-10-CM

## 2023-11-09 PROCEDURE — 93798 PHYS/QHP OP CAR RHAB W/ECG: CPT

## 2023-11-14 ENCOUNTER — TELEPHONE (OUTPATIENT)
Dept: CARDIAC REHAB | Facility: HOSPITAL | Age: 53
End: 2023-11-14
Payer: COMMERCIAL

## 2023-11-28 ENCOUNTER — TREATMENT (OUTPATIENT)
Dept: CARDIAC REHAB | Facility: HOSPITAL | Age: 53
End: 2023-11-28
Payer: COMMERCIAL

## 2023-11-28 DIAGNOSIS — I50.20 SYSTOLIC CONGESTIVE HEART FAILURE, UNSPECIFIED HF CHRONICITY: Primary | ICD-10-CM

## 2023-11-28 PROCEDURE — 93798 PHYS/QHP OP CAR RHAB W/ECG: CPT

## 2023-11-30 ENCOUNTER — TREATMENT (OUTPATIENT)
Dept: CARDIAC REHAB | Facility: HOSPITAL | Age: 53
End: 2023-11-30
Payer: COMMERCIAL

## 2023-11-30 DIAGNOSIS — I50.20 SYSTOLIC CONGESTIVE HEART FAILURE, UNSPECIFIED HF CHRONICITY: Primary | ICD-10-CM

## 2023-11-30 PROCEDURE — 93798 PHYS/QHP OP CAR RHAB W/ECG: CPT

## 2023-12-05 ENCOUNTER — TREATMENT (OUTPATIENT)
Dept: CARDIAC REHAB | Facility: HOSPITAL | Age: 53
End: 2023-12-05
Payer: COMMERCIAL

## 2023-12-05 DIAGNOSIS — I50.20 SYSTOLIC CONGESTIVE HEART FAILURE, UNSPECIFIED HF CHRONICITY: Primary | ICD-10-CM

## 2023-12-05 PROCEDURE — 93798 PHYS/QHP OP CAR RHAB W/ECG: CPT

## 2023-12-07 ENCOUNTER — TREATMENT (OUTPATIENT)
Dept: CARDIAC REHAB | Facility: HOSPITAL | Age: 53
End: 2023-12-07
Payer: COMMERCIAL

## 2023-12-07 DIAGNOSIS — I50.20 SYSTOLIC CONGESTIVE HEART FAILURE, UNSPECIFIED HF CHRONICITY: Primary | ICD-10-CM

## 2023-12-07 PROCEDURE — 93798 PHYS/QHP OP CAR RHAB W/ECG: CPT

## 2023-12-12 ENCOUNTER — TREATMENT (OUTPATIENT)
Dept: CARDIAC REHAB | Facility: HOSPITAL | Age: 53
End: 2023-12-12
Payer: COMMERCIAL

## 2023-12-12 DIAGNOSIS — I50.20 SYSTOLIC CONGESTIVE HEART FAILURE, UNSPECIFIED HF CHRONICITY: Primary | ICD-10-CM

## 2023-12-12 PROCEDURE — 93798 PHYS/QHP OP CAR RHAB W/ECG: CPT

## 2023-12-14 ENCOUNTER — TREATMENT (OUTPATIENT)
Dept: CARDIAC REHAB | Facility: HOSPITAL | Age: 53
End: 2023-12-14
Payer: COMMERCIAL

## 2023-12-14 DIAGNOSIS — I50.20 SYSTOLIC CONGESTIVE HEART FAILURE, UNSPECIFIED HF CHRONICITY: Primary | ICD-10-CM

## 2023-12-14 PROCEDURE — 93798 PHYS/QHP OP CAR RHAB W/ECG: CPT

## 2023-12-19 ENCOUNTER — TREATMENT (OUTPATIENT)
Dept: CARDIAC REHAB | Facility: HOSPITAL | Age: 53
End: 2023-12-19
Payer: COMMERCIAL

## 2023-12-19 DIAGNOSIS — I50.20 SYSTOLIC CONGESTIVE HEART FAILURE, UNSPECIFIED HF CHRONICITY: Primary | ICD-10-CM

## 2023-12-19 PROCEDURE — 93798 PHYS/QHP OP CAR RHAB W/ECG: CPT

## 2023-12-26 ENCOUNTER — APPOINTMENT (OUTPATIENT)
Dept: CARDIAC REHAB | Facility: HOSPITAL | Age: 53
End: 2023-12-26
Payer: COMMERCIAL

## 2023-12-28 ENCOUNTER — APPOINTMENT (OUTPATIENT)
Dept: CARDIAC REHAB | Facility: HOSPITAL | Age: 53
End: 2023-12-28
Payer: COMMERCIAL

## 2024-01-02 ENCOUNTER — APPOINTMENT (OUTPATIENT)
Dept: CARDIAC REHAB | Facility: HOSPITAL | Age: 54
End: 2024-01-02
Payer: COMMERCIAL

## 2024-01-04 ENCOUNTER — APPOINTMENT (OUTPATIENT)
Dept: CARDIAC REHAB | Facility: HOSPITAL | Age: 54
End: 2024-01-04
Payer: COMMERCIAL

## 2024-01-09 ENCOUNTER — TELEPHONE (OUTPATIENT)
Dept: CARDIAC REHAB | Facility: HOSPITAL | Age: 54
End: 2024-01-09
Payer: COMMERCIAL

## 2024-01-09 ENCOUNTER — APPOINTMENT (OUTPATIENT)
Dept: CARDIAC REHAB | Facility: HOSPITAL | Age: 54
End: 2024-01-09
Payer: COMMERCIAL

## 2024-01-09 NOTE — TELEPHONE ENCOUNTER
I called and spoke with the patient to inform her that I have received her release to return to Cardiac Rehab following her recent Cath. She informed me that her Cardiologist has put her on a new medication, therefore she does not want to attend this week but plans to return next Tuesday, January 16, 2024.

## 2024-01-10 DIAGNOSIS — F41.8 DEPRESSION WITH ANXIETY: ICD-10-CM

## 2024-01-10 RX ORDER — SERTRALINE HYDROCHLORIDE 100 MG/1
100 TABLET, FILM COATED ORAL DAILY
Qty: 90 TABLET | Refills: 0 | Status: SHIPPED | OUTPATIENT
Start: 2024-01-10

## 2024-01-11 ENCOUNTER — APPOINTMENT (OUTPATIENT)
Dept: CARDIAC REHAB | Facility: HOSPITAL | Age: 54
End: 2024-01-11
Payer: COMMERCIAL

## 2024-01-16 ENCOUNTER — APPOINTMENT (OUTPATIENT)
Dept: CARDIAC REHAB | Facility: HOSPITAL | Age: 54
End: 2024-01-16
Payer: COMMERCIAL

## 2024-01-18 ENCOUNTER — APPOINTMENT (OUTPATIENT)
Dept: CARDIAC REHAB | Facility: HOSPITAL | Age: 54
End: 2024-01-18
Payer: COMMERCIAL

## 2024-01-23 ENCOUNTER — TREATMENT (OUTPATIENT)
Dept: CARDIAC REHAB | Facility: HOSPITAL | Age: 54
End: 2024-01-23
Payer: COMMERCIAL

## 2024-01-23 ENCOUNTER — APPOINTMENT (OUTPATIENT)
Dept: CARDIAC REHAB | Facility: HOSPITAL | Age: 54
End: 2024-01-23
Payer: COMMERCIAL

## 2024-01-23 DIAGNOSIS — I50.20 SYSTOLIC CONGESTIVE HEART FAILURE, UNSPECIFIED HF CHRONICITY: Primary | ICD-10-CM

## 2024-01-23 PROCEDURE — 93798 PHYS/QHP OP CAR RHAB W/ECG: CPT

## 2024-01-25 ENCOUNTER — TREATMENT (OUTPATIENT)
Dept: CARDIAC REHAB | Facility: HOSPITAL | Age: 54
End: 2024-01-25
Payer: COMMERCIAL

## 2024-01-25 ENCOUNTER — APPOINTMENT (OUTPATIENT)
Dept: CARDIAC REHAB | Facility: HOSPITAL | Age: 54
End: 2024-01-25
Payer: COMMERCIAL

## 2024-01-25 DIAGNOSIS — I50.20 SYSTOLIC CONGESTIVE HEART FAILURE, UNSPECIFIED HF CHRONICITY: Primary | ICD-10-CM

## 2024-01-25 PROCEDURE — 93798 PHYS/QHP OP CAR RHAB W/ECG: CPT

## 2024-01-30 ENCOUNTER — TREATMENT (OUTPATIENT)
Dept: CARDIAC REHAB | Facility: HOSPITAL | Age: 54
End: 2024-01-30
Payer: COMMERCIAL

## 2024-01-30 ENCOUNTER — APPOINTMENT (OUTPATIENT)
Dept: CARDIAC REHAB | Facility: HOSPITAL | Age: 54
End: 2024-01-30
Payer: COMMERCIAL

## 2024-01-30 DIAGNOSIS — I50.20 SYSTOLIC CONGESTIVE HEART FAILURE, UNSPECIFIED HF CHRONICITY: Primary | ICD-10-CM

## 2024-01-30 PROCEDURE — 93798 PHYS/QHP OP CAR RHAB W/ECG: CPT

## 2024-02-01 ENCOUNTER — TREATMENT (OUTPATIENT)
Dept: CARDIAC REHAB | Facility: HOSPITAL | Age: 54
End: 2024-02-01
Payer: COMMERCIAL

## 2024-02-01 DIAGNOSIS — I50.20 SYSTOLIC CONGESTIVE HEART FAILURE, UNSPECIFIED HF CHRONICITY: Primary | ICD-10-CM

## 2024-02-01 PROCEDURE — 93798 PHYS/QHP OP CAR RHAB W/ECG: CPT

## 2024-02-06 ENCOUNTER — TELEPHONE (OUTPATIENT)
Dept: CARDIAC REHAB | Facility: HOSPITAL | Age: 54
End: 2024-02-06
Payer: COMMERCIAL

## 2024-02-06 ENCOUNTER — APPOINTMENT (OUTPATIENT)
Dept: CARDIAC REHAB | Facility: HOSPITAL | Age: 54
End: 2024-02-06
Payer: COMMERCIAL

## 2024-02-08 ENCOUNTER — TREATMENT (OUTPATIENT)
Dept: CARDIAC REHAB | Facility: HOSPITAL | Age: 54
End: 2024-02-08
Payer: COMMERCIAL

## 2024-02-08 DIAGNOSIS — I50.20 SYSTOLIC CONGESTIVE HEART FAILURE, UNSPECIFIED HF CHRONICITY: Primary | ICD-10-CM

## 2024-02-08 PROCEDURE — 93798 PHYS/QHP OP CAR RHAB W/ECG: CPT

## 2024-02-12 ENCOUNTER — LAB (OUTPATIENT)
Dept: LAB | Facility: HOSPITAL | Age: 54
End: 2024-02-12
Payer: COMMERCIAL

## 2024-02-12 ENCOUNTER — TRANSCRIBE ORDERS (OUTPATIENT)
Dept: LAB | Facility: HOSPITAL | Age: 54
End: 2024-02-12
Payer: COMMERCIAL

## 2024-02-12 DIAGNOSIS — E55.9 VITAMIN D DEFICIENCY: ICD-10-CM

## 2024-02-12 DIAGNOSIS — R60.9 EDEMA, UNSPECIFIED TYPE: ICD-10-CM

## 2024-02-12 DIAGNOSIS — N18.2 CHRONIC KIDNEY DISEASE, STAGE II (MILD): ICD-10-CM

## 2024-02-12 DIAGNOSIS — N18.2 CHRONIC KIDNEY DISEASE, STAGE II (MILD): Primary | ICD-10-CM

## 2024-02-12 LAB
ANION GAP SERPL CALCULATED.3IONS-SCNC: 11.3 MMOL/L (ref 5–15)
BUN SERPL-MCNC: 22 MG/DL (ref 6–20)
BUN/CREAT SERPL: 17.5 (ref 7–25)
CALCIUM SPEC-SCNC: 9.7 MG/DL (ref 8.6–10.5)
CHLORIDE SERPL-SCNC: 102 MMOL/L (ref 98–107)
CO2 SERPL-SCNC: 26.7 MMOL/L (ref 22–29)
CREAT SERPL-MCNC: 1.26 MG/DL (ref 0.57–1)
DEPRECATED RDW RBC AUTO: 52.6 FL (ref 37–54)
EGFRCR SERPLBLD CKD-EPI 2021: 50.8 ML/MIN/1.73
ERYTHROCYTE [DISTWIDTH] IN BLOOD BY AUTOMATED COUNT: 14.6 % (ref 12.3–15.4)
GLUCOSE SERPL-MCNC: 92 MG/DL (ref 65–99)
HCT VFR BLD AUTO: 40.8 % (ref 34–46.6)
HGB BLD-MCNC: 12.9 G/DL (ref 12–15.9)
MCH RBC QN AUTO: 30.5 PG (ref 26.6–33)
MCHC RBC AUTO-ENTMCNC: 31.6 G/DL (ref 31.5–35.7)
MCV RBC AUTO: 96.5 FL (ref 79–97)
PHOSPHATE SERPL-MCNC: 3.5 MG/DL (ref 2.5–4.5)
PLATELET # BLD AUTO: 201 10*3/MM3 (ref 140–450)
PMV BLD AUTO: 10.3 FL (ref 6–12)
POTASSIUM SERPL-SCNC: 4.1 MMOL/L (ref 3.5–5.2)
RBC # BLD AUTO: 4.23 10*6/MM3 (ref 3.77–5.28)
SODIUM SERPL-SCNC: 140 MMOL/L (ref 136–145)
WBC NRBC COR # BLD AUTO: 6.44 10*3/MM3 (ref 3.4–10.8)

## 2024-02-12 PROCEDURE — 80048 BASIC METABOLIC PNL TOTAL CA: CPT

## 2024-02-12 PROCEDURE — 85027 COMPLETE CBC AUTOMATED: CPT

## 2024-02-12 PROCEDURE — 82306 VITAMIN D 25 HYDROXY: CPT

## 2024-02-12 PROCEDURE — 83970 ASSAY OF PARATHORMONE: CPT

## 2024-02-12 PROCEDURE — 36415 COLL VENOUS BLD VENIPUNCTURE: CPT

## 2024-02-12 PROCEDURE — 84100 ASSAY OF PHOSPHORUS: CPT

## 2024-02-13 ENCOUNTER — LAB (OUTPATIENT)
Dept: LAB | Facility: HOSPITAL | Age: 54
End: 2024-02-13
Payer: COMMERCIAL

## 2024-02-13 ENCOUNTER — TREATMENT (OUTPATIENT)
Dept: CARDIAC REHAB | Facility: HOSPITAL | Age: 54
End: 2024-02-13
Payer: COMMERCIAL

## 2024-02-13 DIAGNOSIS — E55.9 VITAMIN D DEFICIENCY: ICD-10-CM

## 2024-02-13 DIAGNOSIS — N18.2 CHRONIC KIDNEY DISEASE, STAGE II (MILD): ICD-10-CM

## 2024-02-13 DIAGNOSIS — R60.9 EDEMA, UNSPECIFIED TYPE: ICD-10-CM

## 2024-02-13 DIAGNOSIS — I50.20 SYSTOLIC CONGESTIVE HEART FAILURE, UNSPECIFIED HF CHRONICITY: Primary | ICD-10-CM

## 2024-02-13 LAB
25(OH)D3 SERPL-MCNC: 10.8 NG/ML (ref 30–100)
BACTERIA UR QL AUTO: ABNORMAL /HPF
BILIRUB UR QL STRIP: NEGATIVE
CLARITY UR: ABNORMAL
COLOR UR: YELLOW
CREAT UR-MCNC: 69.1 MG/DL
GLUCOSE UR STRIP-MCNC: ABNORMAL MG/DL
HGB UR QL STRIP.AUTO: ABNORMAL
KETONES UR QL STRIP: NEGATIVE
LEUKOCYTE ESTERASE UR QL STRIP.AUTO: ABNORMAL
NITRITE UR QL STRIP: NEGATIVE
PH UR STRIP.AUTO: 6 [PH] (ref 5–8)
PROT ?TM UR-MCNC: 7 MG/DL
PROT UR QL STRIP: NEGATIVE
PROT/CREAT UR: 0.1 MG/G{CREAT}
PTH-INTACT SERPL-MCNC: 103 PG/ML (ref 15–65)
RBC # UR STRIP: ABNORMAL /HPF
REF LAB TEST METHOD: ABNORMAL
SP GR UR STRIP: 1.02 (ref 1–1.03)
SQUAMOUS #/AREA URNS HPF: ABNORMAL /HPF
UROBILINOGEN UR QL STRIP: ABNORMAL
WBC # UR STRIP: ABNORMAL /HPF

## 2024-02-13 PROCEDURE — 93798 PHYS/QHP OP CAR RHAB W/ECG: CPT

## 2024-02-13 PROCEDURE — 82570 ASSAY OF URINE CREATININE: CPT

## 2024-02-13 PROCEDURE — 84156 ASSAY OF PROTEIN URINE: CPT

## 2024-02-13 PROCEDURE — 81001 URINALYSIS AUTO W/SCOPE: CPT

## 2024-02-15 ENCOUNTER — TREATMENT (OUTPATIENT)
Dept: CARDIAC REHAB | Facility: HOSPITAL | Age: 54
End: 2024-02-15
Payer: COMMERCIAL

## 2024-02-15 DIAGNOSIS — I50.20 SYSTOLIC CONGESTIVE HEART FAILURE, UNSPECIFIED HF CHRONICITY: Primary | ICD-10-CM

## 2024-02-15 PROCEDURE — 93798 PHYS/QHP OP CAR RHAB W/ECG: CPT

## 2024-02-20 ENCOUNTER — TREATMENT (OUTPATIENT)
Dept: CARDIAC REHAB | Facility: HOSPITAL | Age: 54
End: 2024-02-20
Payer: COMMERCIAL

## 2024-02-20 DIAGNOSIS — I50.20 SYSTOLIC CONGESTIVE HEART FAILURE, UNSPECIFIED HF CHRONICITY: Primary | ICD-10-CM

## 2024-02-20 PROCEDURE — 93798 PHYS/QHP OP CAR RHAB W/ECG: CPT

## 2024-02-20 NOTE — PROGRESS NOTES
Your Personalized Prevention Plan Services (PPPS)    Preventive Services Checklist (Assumes Average Risk Unless Otherwise Noted):    Health Maintenance Topics with due status: Overdue       Topic Date Due    Zoster Vaccine 04/05/2012    DTaP, Tdap, and Td Vaccines 11/01/2015    Medicare Annual Wellness Visit 06/29/2019    COVID-19 Vaccine 12/24/2021     Health Maintenance Topics with due status: Not Due       Topic Last Completion Date    Depression Screening 03/24/2022    Falls Risk Screening 03/24/2022     Health Maintenance Topics with due status: Completed       Topic Last Completion Date    Pneumococcal (65 years and older) 01/09/2019    Influenza Vaccine 09/26/2022     Health Maintenance Topics with due status: Aged Out       Topic Date Due    Meningococcal ACWY Aged Out    HIB Vaccines Aged Out    Hepatitis B Vaccines Aged Out    IPV Vaccines Aged Out    HPV Vaccines Aged Out       You May Be Eligible for These Additional Preventive Services   (Assumes Average Risk Unless Otherwise Noted)  Diabetes Screening Any 1 risk factor: hypertension, dyslipidemia, obesity, high glucose; or Any 2 risk factors: >=66yo, overweight, family history diabetes (covered every 6 months)   Hepatitis C Screening Any 1 risk factor: 1) blood transfusion before 1992,   2) current or past injection drug use (annually for high risk; if born between [birth year]40, see above for status).   Vaccine: Hepatitis B As necessary if at-risk: hemophilia, ESRD, diabetes, living with individual infected with hep B, healthcare worker with frequent contact with blood/bodily fluids (series covered once)   Sexually Transmitted Diseases (STDs) As necessary chlamydia, gonorrhea, syphilis, hepatitis B (covered annually)  HIV if any 1 risk factor present: 1) <16yo or >66yo and at increased risk or 2) 15-66yo and ask for it (covered annually)   Lung Cancer Screening Low dose chest CT if all three risk factors: 1) 50-76yo, 2) smoker or  Pt tolerated exercises see scanned report.   quit within last 15y, 3) >=20 pack years (covered annually).  No results found for this or any previous visit.       Cholesterol Screening No signs or symptoms of cardiovascular disease (screening covered every 5 years).     Prostate Cancer Screening >= 51yo if patient desires test after weighting potential harms and benefits (covered annually)         Health Risk Factors with Personalized Education:  ----------------------------------------------------------------------------------------------------------------------  Controlling Your Blood Pressure  · Maintain a normal weight (body mass index between 18.5 and 24.9).  · Eat more fruit, vegetables and low-fat dairy.  · Eat less saturated fat and total fat.  · Lower your sodium (salt) intake.  Try to stay under 1500 mg per day, but if you cannot get your intake to be that low, at least lower it by 1000 mg.  · Stay active.  Try to get at least 90 to 150 minutes of exercise per week.  Try brisk walking, swimming, bicycling or dancing.  · Limit alcohol intake.  When you do consume alcohol, drink no more than 2 drinks per day.  · If you have been prescribed medication, take it regularly and exactly as prescribed.  Let your PCP know if you have any problems or questions about your medication.  · Check your blood pressure at home or at the store.  Write down your readings and share them with your PCP  ----------------------------------------------------------------------------------------------------------------------  Controlling Your Glucose (Sugar) Levels  · Stress can raise your blood sugar.  Learn what causes your stress.  Learn to lower your stress by spending time with family and friends, exercising, deep breathing, trying meditation or yoga, enjoying hobbies and getting enough rest.  Let your PCP know if you’re having problems limiting your stress.  · Maintain a healthy weight by balancing your diet and exercise.  · Choose foods that are lower in calories,  saturated fat, trans fat, sugar and salt.  Eat foods with more fiber, like whole grain cereals, bread, crackers, rice or pasta.  Choose to eat fruit, vegetables, and low-fat or fat-free/skim dairy.  · Reduce the portion size of your meals.  Make half of your meal vegetables and fruit, and divide the other half between lean protein and whole grains.  · Drink water instead of juice and regular soda.  · Spend at least some time being active on most days of the week.  · Work to increase your muscle strength at least twice per week.  Try things like light weights, stretch bands, yoga, heavy gardening (digging, planting with tools) or push-ups  ----------------------------------------------------------------------------------------------------------------------  Controlling Your Cholesterol  · Reduce the amount of saturated and trans fat in your diet.  Limit intake of red meat.  Consume only low-fat or non-fat/skim dairy.  Limit fried food.  Cook with vegetable oils.  · Reduce your intake of sugary foods, sugary drinks and alcohol.  · Eat a diet high in fruit, vegetables and whole grains.  · Get protein from fish, poultry and a small portion of nuts.  · Stay active.  Try to get at least 90 to 150 minutes of exercise per week.  Try brisk walking, swimming, bicycling or dancing.  · Maintain a healthy weight by balancing your diet and exercise.  · If you have been prescribed medication, take it regularly and exactly as prescribed. Let your PCP know if you have any problems or questions about your medication.  · It’s important to know your cholesterol numbers.  When recommended by your PCP, get the cholesterol blood test.  ----------------------------------------------------------------------------------------------------------------------  Reducing Your Risk of Falls  · Tell your PCP if any of your medications make you feel tired, dizzy, lightheaded or off-balance.  · Maintain coordination, flexibility and balance by  ensuring regular physical activity.  · Limit alcohol intake to 1 drink per day.  Consider avoiding all alcohol intake.  · Ensure good vision.  Visit an ophthalmologist or optometrist regularly for vision screening or to make sure your glasses / contact lens prescription is correct.  If you need glasses or contacts, wear them.  When you get new glasses or contacts, take time to get used to them.  Do not wear sunglasses or tinted lenses when indoors.  · Ensure good hearing.  Have your hearing checked if you are having trouble hearing, or family and friends think you cannot hear them.  If you need a hearing aid, be sure it fits well and wear it.  · Get enough rest.  Ensure about 7-9 hours of sleep every day.  · Get up slowly from your bed or chairs.  Do not start walking until you are sure you feel steady.  · Wear non-skid, rubber-soled, low-heeled shoes.  Do not walk in socks, or in shoes and slippers with smooth soles.  · If your PCP or therapist recommends using a cane or walker, use it regularly.  · Make your home safer.  Increase lighting throughout the house, especially at the top and bottom of stairs.  Ensure lighting is easily turned on when getting up in the middle of the night.  Make sure there are two secure rails on all stairs.  Install grab bars in the bathtub / shower and near the toilet.  Consider using a shower chair and / or a hand-held shower.  · Spread sand or salt on icy surfaces.  Beware of wet surfaces, which can be icy.  · Tell your PCP if you have fallen.

## 2024-02-22 ENCOUNTER — TREATMENT (OUTPATIENT)
Dept: CARDIAC REHAB | Facility: HOSPITAL | Age: 54
End: 2024-02-22
Payer: COMMERCIAL

## 2024-02-22 DIAGNOSIS — I50.20 SYSTOLIC CONGESTIVE HEART FAILURE, UNSPECIFIED HF CHRONICITY: Primary | ICD-10-CM

## 2024-02-22 PROCEDURE — 93798 PHYS/QHP OP CAR RHAB W/ECG: CPT

## 2024-02-27 ENCOUNTER — TREATMENT (OUTPATIENT)
Dept: CARDIAC REHAB | Facility: HOSPITAL | Age: 54
End: 2024-02-27
Payer: COMMERCIAL

## 2024-02-27 DIAGNOSIS — I50.20 SYSTOLIC CONGESTIVE HEART FAILURE, UNSPECIFIED HF CHRONICITY: Primary | ICD-10-CM

## 2024-02-27 PROCEDURE — 93798 PHYS/QHP OP CAR RHAB W/ECG: CPT

## 2024-02-28 ENCOUNTER — TRANSCRIBE ORDERS (OUTPATIENT)
Dept: LAB | Facility: HOSPITAL | Age: 54
End: 2024-02-28
Payer: COMMERCIAL

## 2024-02-28 ENCOUNTER — LAB (OUTPATIENT)
Dept: LAB | Facility: HOSPITAL | Age: 54
End: 2024-02-28
Payer: COMMERCIAL

## 2024-02-28 DIAGNOSIS — I50.9 CONGESTIVE HEART FAILURE, UNSPECIFIED HF CHRONICITY, UNSPECIFIED HEART FAILURE TYPE: Primary | ICD-10-CM

## 2024-02-28 DIAGNOSIS — I50.9 CONGESTIVE HEART FAILURE, UNSPECIFIED HF CHRONICITY, UNSPECIFIED HEART FAILURE TYPE: ICD-10-CM

## 2024-02-28 LAB
ANION GAP SERPL CALCULATED.3IONS-SCNC: 11.1 MMOL/L (ref 5–15)
BUN SERPL-MCNC: 20 MG/DL (ref 6–20)
BUN/CREAT SERPL: 15.2 (ref 7–25)
CALCIUM SPEC-SCNC: 9.4 MG/DL (ref 8.6–10.5)
CHLORIDE SERPL-SCNC: 104 MMOL/L (ref 98–107)
CO2 SERPL-SCNC: 26.9 MMOL/L (ref 22–29)
CREAT SERPL-MCNC: 1.32 MG/DL (ref 0.57–1)
EGFRCR SERPLBLD CKD-EPI 2021: 48.1 ML/MIN/1.73
GLUCOSE SERPL-MCNC: 97 MG/DL (ref 65–99)
POTASSIUM SERPL-SCNC: 4.6 MMOL/L (ref 3.5–5.2)
SODIUM SERPL-SCNC: 142 MMOL/L (ref 136–145)

## 2024-02-28 PROCEDURE — 36415 COLL VENOUS BLD VENIPUNCTURE: CPT

## 2024-02-28 PROCEDURE — 80048 BASIC METABOLIC PNL TOTAL CA: CPT

## 2024-02-29 ENCOUNTER — TREATMENT (OUTPATIENT)
Dept: CARDIAC REHAB | Facility: HOSPITAL | Age: 54
End: 2024-02-29
Payer: COMMERCIAL

## 2024-02-29 DIAGNOSIS — I50.20 SYSTOLIC CONGESTIVE HEART FAILURE, UNSPECIFIED HF CHRONICITY: Primary | ICD-10-CM

## 2024-02-29 PROCEDURE — 93798 PHYS/QHP OP CAR RHAB W/ECG: CPT

## 2024-03-04 ENCOUNTER — OFFICE VISIT (OUTPATIENT)
Dept: FAMILY MEDICINE CLINIC | Age: 54
End: 2024-03-04
Payer: MEDICARE

## 2024-03-04 VITALS — WEIGHT: 244.6 LBS | BODY MASS INDEX: 43.34 KG/M2 | HEIGHT: 63 IN | OXYGEN SATURATION: 94 %

## 2024-03-04 DIAGNOSIS — N18.31 STAGE 3A CHRONIC KIDNEY DISEASE: ICD-10-CM

## 2024-03-04 DIAGNOSIS — I42.0 NONISCHEMIC CONGESTIVE CARDIOMYOPATHY: ICD-10-CM

## 2024-03-04 DIAGNOSIS — F34.1 DYSTHYMIC DISORDER: Primary | ICD-10-CM

## 2024-03-04 DIAGNOSIS — Z95.811 LVAD (LEFT VENTRICULAR ASSIST DEVICE) PRESENT: ICD-10-CM

## 2024-03-04 DIAGNOSIS — I10 ESSENTIAL HYPERTENSION: ICD-10-CM

## 2024-03-04 PROBLEM — Z00.00 MEDICARE ANNUAL WELLNESS VISIT, SUBSEQUENT: Status: RESOLVED | Noted: 2023-08-28 | Resolved: 2024-03-04

## 2024-03-04 PROBLEM — E55.9 VITAMIN D DEFICIENCY: Status: ACTIVE | Noted: 2023-10-19

## 2024-03-04 RX ORDER — ACETAMINOPHEN 160 MG
2000 TABLET,DISINTEGRATING ORAL DAILY
COMMUNITY

## 2024-03-04 RX ORDER — GABAPENTIN 300 MG/1
300 CAPSULE ORAL 3 TIMES DAILY
COMMUNITY
Start: 2023-12-06

## 2024-03-04 RX ORDER — ASPIRIN 325 MG
325 TABLET ORAL DAILY
COMMUNITY

## 2024-03-04 NOTE — PROGRESS NOTES
Chief Complaint  Hypertension (6 months)    Subjective          Ashely Moore presents to [facility]  History of Present Illness  --TOLERATING BLOOD PRESSURE MEDICATION WITHOUT APPARENT SIDE EFFECTS  --THE DEPRESSION IS STABLE ON THE ZOLOFT  --LAST GFR WAS DOWN TO 44, FOLLOWED BY NEPHROLOGY  --CONTINUES WITH CARDIOLOGY FOR THE CONGESTIVE HEART FAILURE, CARDIOMYOPATHY AND THE LVAD.          No Known Allergies     Health Maintenance Due   Topic Date Due    MAMMOGRAM  Never done    COLORECTAL CANCER SCREENING  Never done    Pneumococcal Vaccine 0-64 (1 of 2 - PCV) Never done    Hepatitis B (1 of 3 - 19+ 3-dose series) Never done    TDAP/TD VACCINES (1 - Tdap) Never done    ZOSTER VACCINE (1 of 2) Never done    LUNG CANCER SCREENING  Never done    HEPATITIS C SCREENING  Never done    PAP SMEAR  Never done    INFLUENZA VACCINE  Never done    COVID-19 Vaccine (5 - 2023-24 season) 09/01/2023        Current Outpatient Medications on File Prior to Visit   Medication Sig    amiodarone (PACERONE) 200 MG tablet Take 1 tablet by mouth Daily.    aspirin 325 MG tablet Take 1 tablet by mouth Daily.    atorvastatin (LIPITOR) 40 MG tablet Take 1 tablet by mouth Daily.    bumetanide (BUMEX) 2 MG tablet Take 1 tablet by mouth 2 (two) times a day.    Cholecalciferol (Vitamin D3) 50 MCG (2000 UT) capsule Take 1 capsule by mouth Daily.    Eliquis 2.5 MG tablet tablet Take 1 tablet by mouth Every 12 (Twelve) Hours.    Entresto  MG tablet 1 Tab, Oral, Tab, BID, # 180 Tab, 5 Refill(s), Pharmacy: [facility] [id number], cm, 06/21/23 10:26:00 EDT, CLINICALHEIGHT, 106.48, kg, 06/21/23 10:26:00 EDT, CLINICALWEIGHT, 167.64    Farxiga 10 MG tablet Daily.    gabapentin (NEURONTIN) 300 MG capsule Take 1 capsule by mouth 3 (Three) Times a Day.    MAGnesium-Oxide 400 (240 Mg) MG tablet     potassium chloride (KLOR-CON) 20 MEQ packet Take 40 mEq by mouth 2 (Two) Times a Day.    Semaglutide (OZEMPIC, 1  "MG/DOSE, SC) Inject  under the skin into the appropriate area as directed 1 (One) Time Per Week.    sertraline (ZOLOFT) 100 MG tablet TAKE 1 TABLET BY MOUTH DAILY    spironolactone (ALDACTONE) 25 MG tablet Take 1 tablet by mouth Every Night.    [DISCONTINUED] aspirin 81 MG EC tablet Take 1 tablet by mouth Every Morning. Pt told by cardiologist to do what surgeon says and pt called surgeon and he said to stop day before so last dose will be 3/21/2021     No current facility-administered medications on file prior to visit.       Immunization History   Administered Date(s) Administered    COVID-19 (MODERNA) 1st,2nd,3rd Dose Monovalent 01/04/2022    COVID-19 (PFIZER) BIVALENT 12+YRS 01/24/2023    COVID-19 (PFIZER) Purple Cap Monovalent 03/17/2021, 05/17/2021       Review of Systems   Constitutional:  Positive for fever. Negative for activity change, appetite change, chills and fatigue.   HENT:  Negative for congestion, ear pain, rhinorrhea and sore throat.    Respiratory:  Negative for cough and shortness of breath.    Cardiovascular:  Negative for chest pain, palpitations and leg swelling.   Gastrointestinal:  Negative for abdominal pain, constipation, diarrhea, nausea and vomiting.   Musculoskeletal:  Negative for arthralgias and myalgias.   Neurological:  Negative for headache.        Objective     Ht 160 cm (63\")   Wt 111 kg (244 lb 9.6 oz)   SpO2 94% Comment: on room air  BMI 43.33 kg/m²       Physical Exam  Vitals and nursing note reviewed.   Constitutional:       General: She is not in acute distress.     Appearance: Normal appearance.   Cardiovascular:      Rate and Rhythm: Normal rate and regular rhythm.      Heart sounds: Normal heart sounds. No murmur heard.  Pulmonary:      Effort: Pulmonary effort is normal.      Breath sounds: Normal breath sounds.   Abdominal:      Palpations: Abdomen is soft.      Tenderness: There is no abdominal tenderness.   Musculoskeletal:      Cervical back: Neck supple.      " Right lower leg: No edema.      Left lower leg: No edema.   Lymphadenopathy:      Cervical: No cervical adenopathy.   Neurological:      General: No focal deficit present.      Mental Status: She is alert.      Cranial Nerves: No cranial nerve deficit.      Coordination: Coordination normal.      Gait: Gait normal.   Psychiatric:         Mood and Affect: Mood normal.         Behavior: Behavior normal.         Result Review :                             Assessment and Plan      Diagnoses and all orders for this visit:    1. Dysthymic disorder (Primary)  Assessment & Plan:  Patient's depression is a single episode that is mild without psychosis. Depression is in partial remission and stable.    Plan:   Continue current medication therapy     Followup in 6 months.       2. Essential hypertension  Assessment & Plan:  Hypertension is stable and controlled  Continue current treatment regimen.  Blood pressure will be reassessed in 6 months.      3. Stage 3a chronic kidney disease  Assessment & Plan:  Renal condition is stable.  Continue current treatment regimen.  Renal condition will be reassessed in 6 months  PLANS PER NEPHROLOGY .      4. LVAD (left ventricular assist device) present  Comments:  PER CARDIOLOGY    5. Nonischemic congestive cardiomyopathy  Comments:  PER CARDIOLOGY            Follow Up     Return in about 6 months (around 9/4/2024).    Patient was given instructions and counseling regarding her condition or for health maintenance advice. Please see specific information pulled into the AVS if appropriate.

## 2024-03-04 NOTE — ASSESSMENT & PLAN NOTE
Hypertension is stable and controlled  Continue current treatment regimen.  Blood pressure will be reassessed in 6 months.  
Patient's depression is a single episode that is mild without psychosis. Depression is in partial remission and stable.    Plan:   Continue current medication therapy     Followup in 6 months.   
Renal condition is stable.  Continue current treatment regimen.  Renal condition will be reassessed in 6 months  PLANS PER NEPHROLOGY .  
Taxi

## 2024-03-05 ENCOUNTER — TREATMENT (OUTPATIENT)
Dept: CARDIAC REHAB | Facility: HOSPITAL | Age: 54
End: 2024-03-05
Payer: COMMERCIAL

## 2024-03-05 DIAGNOSIS — I50.20 SYSTOLIC CONGESTIVE HEART FAILURE, UNSPECIFIED HF CHRONICITY: Primary | ICD-10-CM

## 2024-03-05 PROCEDURE — 93798 PHYS/QHP OP CAR RHAB W/ECG: CPT

## 2024-03-07 ENCOUNTER — TREATMENT (OUTPATIENT)
Dept: CARDIAC REHAB | Facility: HOSPITAL | Age: 54
End: 2024-03-07
Payer: COMMERCIAL

## 2024-03-07 DIAGNOSIS — I50.20 SYSTOLIC CONGESTIVE HEART FAILURE, UNSPECIFIED HF CHRONICITY: Primary | ICD-10-CM

## 2024-03-07 PROCEDURE — 93798 PHYS/QHP OP CAR RHAB W/ECG: CPT

## 2024-03-12 ENCOUNTER — TREATMENT (OUTPATIENT)
Dept: CARDIAC REHAB | Facility: HOSPITAL | Age: 54
End: 2024-03-12
Payer: COMMERCIAL

## 2024-03-12 DIAGNOSIS — I50.20 SYSTOLIC CONGESTIVE HEART FAILURE, UNSPECIFIED HF CHRONICITY: Primary | ICD-10-CM

## 2024-03-12 PROCEDURE — 93798 PHYS/QHP OP CAR RHAB W/ECG: CPT

## 2024-03-14 ENCOUNTER — TREATMENT (OUTPATIENT)
Dept: CARDIAC REHAB | Facility: HOSPITAL | Age: 54
End: 2024-03-14
Payer: COMMERCIAL

## 2024-03-14 DIAGNOSIS — I50.20 SYSTOLIC CONGESTIVE HEART FAILURE, UNSPECIFIED HF CHRONICITY: Primary | ICD-10-CM

## 2024-03-14 PROCEDURE — 93798 PHYS/QHP OP CAR RHAB W/ECG: CPT

## 2024-03-19 ENCOUNTER — TREATMENT (OUTPATIENT)
Dept: CARDIAC REHAB | Facility: HOSPITAL | Age: 54
End: 2024-03-19
Payer: COMMERCIAL

## 2024-03-19 DIAGNOSIS — I50.20 SYSTOLIC CONGESTIVE HEART FAILURE, UNSPECIFIED HF CHRONICITY: Primary | ICD-10-CM

## 2024-03-19 PROCEDURE — 93798 PHYS/QHP OP CAR RHAB W/ECG: CPT

## 2024-03-21 ENCOUNTER — TREATMENT (OUTPATIENT)
Dept: CARDIAC REHAB | Facility: HOSPITAL | Age: 54
End: 2024-03-21
Payer: COMMERCIAL

## 2024-03-21 DIAGNOSIS — I50.20 SYSTOLIC CONGESTIVE HEART FAILURE, UNSPECIFIED HF CHRONICITY: Primary | ICD-10-CM

## 2024-03-21 PROCEDURE — 93798 PHYS/QHP OP CAR RHAB W/ECG: CPT

## 2024-04-02 ENCOUNTER — APPOINTMENT (OUTPATIENT)
Dept: CARDIAC REHAB | Facility: HOSPITAL | Age: 54
End: 2024-04-02
Payer: COMMERCIAL

## 2024-04-09 ENCOUNTER — TREATMENT (OUTPATIENT)
Dept: CARDIAC REHAB | Facility: HOSPITAL | Age: 54
End: 2024-04-09
Payer: COMMERCIAL

## 2024-04-09 DIAGNOSIS — I50.20 SYSTOLIC CONGESTIVE HEART FAILURE, UNSPECIFIED HF CHRONICITY: Primary | ICD-10-CM

## 2024-04-09 DIAGNOSIS — F41.8 DEPRESSION WITH ANXIETY: ICD-10-CM

## 2024-04-09 PROCEDURE — 93798 PHYS/QHP OP CAR RHAB W/ECG: CPT

## 2024-04-09 RX ORDER — SERTRALINE HYDROCHLORIDE 100 MG/1
100 TABLET, FILM COATED ORAL DAILY
Qty: 90 TABLET | Refills: 0 | Status: SHIPPED | OUTPATIENT
Start: 2024-04-09

## 2024-04-11 ENCOUNTER — TREATMENT (OUTPATIENT)
Dept: CARDIAC REHAB | Facility: HOSPITAL | Age: 54
End: 2024-04-11
Payer: COMMERCIAL

## 2024-04-11 DIAGNOSIS — I50.20 SYSTOLIC CONGESTIVE HEART FAILURE, UNSPECIFIED HF CHRONICITY: Primary | ICD-10-CM

## 2024-04-11 PROCEDURE — 93798 PHYS/QHP OP CAR RHAB W/ECG: CPT

## 2024-04-16 ENCOUNTER — TREATMENT (OUTPATIENT)
Dept: CARDIAC REHAB | Facility: HOSPITAL | Age: 54
End: 2024-04-16
Payer: COMMERCIAL

## 2024-04-16 DIAGNOSIS — I50.20 SYSTOLIC CONGESTIVE HEART FAILURE, UNSPECIFIED HF CHRONICITY: Primary | ICD-10-CM

## 2024-04-16 PROCEDURE — 93798 PHYS/QHP OP CAR RHAB W/ECG: CPT

## 2024-04-18 ENCOUNTER — TREATMENT (OUTPATIENT)
Dept: CARDIAC REHAB | Facility: HOSPITAL | Age: 54
End: 2024-04-18
Payer: COMMERCIAL

## 2024-04-18 DIAGNOSIS — I50.20 SYSTOLIC CONGESTIVE HEART FAILURE, UNSPECIFIED HF CHRONICITY: Primary | ICD-10-CM

## 2024-04-18 PROCEDURE — 93798 PHYS/QHP OP CAR RHAB W/ECG: CPT

## 2024-04-23 ENCOUNTER — TREATMENT (OUTPATIENT)
Dept: CARDIAC REHAB | Facility: HOSPITAL | Age: 54
End: 2024-04-23
Payer: COMMERCIAL

## 2024-04-23 DIAGNOSIS — I50.20 SYSTOLIC CONGESTIVE HEART FAILURE, UNSPECIFIED HF CHRONICITY: Primary | ICD-10-CM

## 2024-04-23 PROCEDURE — 93798 PHYS/QHP OP CAR RHAB W/ECG: CPT

## 2024-04-25 ENCOUNTER — TREATMENT (OUTPATIENT)
Dept: CARDIAC REHAB | Facility: HOSPITAL | Age: 54
End: 2024-04-25
Payer: COMMERCIAL

## 2024-04-25 VITALS — BODY MASS INDEX: 44.44 KG/M2 | WEIGHT: 250.88 LBS

## 2024-04-25 DIAGNOSIS — I50.20 SYSTOLIC CONGESTIVE HEART FAILURE, UNSPECIFIED HF CHRONICITY: Primary | ICD-10-CM

## 2024-04-25 PROCEDURE — 93798 PHYS/QHP OP CAR RHAB W/ECG: CPT

## 2024-05-09 ENCOUNTER — OFFICE VISIT (OUTPATIENT)
Dept: FAMILY MEDICINE CLINIC | Age: 54
End: 2024-05-09
Payer: MEDICARE

## 2024-05-09 ENCOUNTER — HOSPITAL ENCOUNTER (OUTPATIENT)
Dept: GENERAL RADIOLOGY | Facility: HOSPITAL | Age: 54
Discharge: HOME | End: 2024-05-09
Admitting: NURSE PRACTITIONER
Payer: COMMERCIAL

## 2024-05-09 VITALS — HEIGHT: 63 IN | WEIGHT: 252.2 LBS | HEART RATE: 87 BPM | BODY MASS INDEX: 44.69 KG/M2

## 2024-05-09 DIAGNOSIS — M79.644 THUMB PAIN, RIGHT: Primary | ICD-10-CM

## 2024-05-09 PROCEDURE — 1125F AMNT PAIN NOTED PAIN PRSNT: CPT | Performed by: NURSE PRACTITIONER

## 2024-05-09 PROCEDURE — 73140 X-RAY EXAM OF FINGER(S): CPT

## 2024-05-09 PROCEDURE — 99213 OFFICE O/P EST LOW 20 MIN: CPT | Performed by: NURSE PRACTITIONER

## 2024-05-09 RX ORDER — LORATADINE 10 MG/1
10 TABLET ORAL DAILY
COMMUNITY

## 2024-05-09 RX ORDER — CHOLECALCIFEROL (VITAMIN D3) 125 MCG
5 CAPSULE ORAL
COMMUNITY

## 2024-05-10 DIAGNOSIS — S62.501D CLOSED AVULSION FRACTURE OF PHALANX OF RIGHT THUMB WITH ROUTINE HEALING, SUBSEQUENT ENCOUNTER: Primary | ICD-10-CM

## 2024-05-13 ENCOUNTER — TELEPHONE (OUTPATIENT)
Dept: ORTHOPEDIC SURGERY | Facility: CLINIC | Age: 54
End: 2024-05-13
Payer: COMMERCIAL

## 2024-05-13 NOTE — TELEPHONE ENCOUNTER
Caller: LAYA   Relationship to Patient:    Phone Number: [phone] (home)     Reason for Call: PATIENTS  WANTS A CALL BACK FROM [facility]

## 2024-05-13 NOTE — TELEPHONE ENCOUNTER
Spoke to patient and explained to her we do not have a Isidra in this office and asked if he could have meant someone else and she stated he called the wrong office

## 2024-07-03 LAB — HBA1C MFR BLD: 5.3 % (ref 4.8–5.6)

## 2024-07-05 DIAGNOSIS — F41.8 DEPRESSION WITH ANXIETY: ICD-10-CM

## 2024-07-06 RX ORDER — SERTRALINE HYDROCHLORIDE 100 MG/1
100 TABLET, FILM COATED ORAL DAILY
Qty: 30 TABLET | Refills: 0 | Status: SHIPPED | OUTPATIENT
Start: 2024-07-06

## 2024-07-16 ENCOUNTER — LAB (OUTPATIENT)
Dept: LAB | Facility: HOSPITAL | Age: 54
End: 2024-07-16
Payer: COMMERCIAL

## 2024-07-16 ENCOUNTER — TRANSCRIBE ORDERS (OUTPATIENT)
Dept: ADMINISTRATIVE | Facility: HOSPITAL | Age: 54
End: 2024-07-16
Payer: COMMERCIAL

## 2024-07-16 DIAGNOSIS — I42.9 CARDIOMYOPATHY, UNSPECIFIED TYPE: Primary | ICD-10-CM

## 2024-07-16 DIAGNOSIS — I42.9 CARDIOMYOPATHY, UNSPECIFIED TYPE: ICD-10-CM

## 2024-07-16 LAB
ANION GAP SERPL CALCULATED.3IONS-SCNC: 10.6 MMOL/L (ref 5–15)
BUN SERPL-MCNC: 29 MG/DL (ref 6–20)
BUN/CREAT SERPL: 17.2 (ref 7–25)
CALCIUM SPEC-SCNC: 9.9 MG/DL (ref 8.6–10.5)
CHLORIDE SERPL-SCNC: 102 MMOL/L (ref 98–107)
CO2 SERPL-SCNC: 26.4 MMOL/L (ref 22–29)
CREAT SERPL-MCNC: 1.69 MG/DL (ref 0.57–1)
EGFRCR SERPLBLD CKD-EPI 2021: 35.7 ML/MIN/1.73
GLUCOSE SERPL-MCNC: 108 MG/DL (ref 65–99)
POTASSIUM SERPL-SCNC: 5.1 MMOL/L (ref 3.5–5.2)
SODIUM SERPL-SCNC: 139 MMOL/L (ref 136–145)

## 2024-07-16 PROCEDURE — 36415 COLL VENOUS BLD VENIPUNCTURE: CPT

## 2024-07-16 PROCEDURE — 80048 BASIC METABOLIC PNL TOTAL CA: CPT

## 2024-07-18 ENCOUNTER — TRANSCRIBE ORDERS (OUTPATIENT)
Dept: ADMINISTRATIVE | Facility: HOSPITAL | Age: 54
End: 2024-07-18
Payer: COMMERCIAL

## 2024-07-18 DIAGNOSIS — Z95.811 PRESENCE OF HEART ASSIST DEVICE: ICD-10-CM

## 2024-07-18 DIAGNOSIS — I50.9 HEART FAILURE, UNSPECIFIED HF CHRONICITY, UNSPECIFIED HEART FAILURE TYPE: Primary | ICD-10-CM

## 2024-07-18 DIAGNOSIS — I42.9 CARDIOMYOPATHY, UNSPECIFIED TYPE: ICD-10-CM

## 2024-07-18 DIAGNOSIS — Z79.01 LONG TERM (CURRENT) USE OF ANTICOAGULANTS: ICD-10-CM

## 2024-07-18 DIAGNOSIS — Z79.899 ENCOUNTER FOR LONG-TERM (CURRENT) USE OF OTHER MEDICATIONS: ICD-10-CM

## 2024-07-29 ENCOUNTER — TRANSCRIBE ORDERS (OUTPATIENT)
Dept: LAB | Facility: HOSPITAL | Age: 54
End: 2024-07-29
Payer: COMMERCIAL

## 2024-07-29 ENCOUNTER — LAB (OUTPATIENT)
Dept: LAB | Facility: HOSPITAL | Age: 54
End: 2024-07-29
Payer: COMMERCIAL

## 2024-07-29 DIAGNOSIS — I50.9 HEART FAILURE, UNSPECIFIED HF CHRONICITY, UNSPECIFIED HEART FAILURE TYPE: ICD-10-CM

## 2024-07-29 DIAGNOSIS — I42.9 CARDIOMYOPATHY, UNSPECIFIED TYPE: ICD-10-CM

## 2024-07-29 DIAGNOSIS — R60.9 EDEMA, UNSPECIFIED TYPE: ICD-10-CM

## 2024-07-29 DIAGNOSIS — N18.2 CHRONIC KIDNEY DISEASE, STAGE II (MILD): Primary | ICD-10-CM

## 2024-07-29 DIAGNOSIS — Z79.899 ENCOUNTER FOR LONG-TERM (CURRENT) USE OF OTHER MEDICATIONS: ICD-10-CM

## 2024-07-29 DIAGNOSIS — E55.9 VITAMIN D DEFICIENCY: ICD-10-CM

## 2024-07-29 DIAGNOSIS — N18.2 CHRONIC KIDNEY DISEASE, STAGE II (MILD): ICD-10-CM

## 2024-07-29 DIAGNOSIS — Z79.01 LONG TERM (CURRENT) USE OF ANTICOAGULANTS: ICD-10-CM

## 2024-07-29 DIAGNOSIS — Z95.811 PRESENCE OF HEART ASSIST DEVICE: ICD-10-CM

## 2024-07-29 LAB
25(OH)D3 SERPL-MCNC: 25.3 NG/ML (ref 30–100)
ANION GAP SERPL CALCULATED.3IONS-SCNC: 11 MMOL/L (ref 5–15)
BUN SERPL-MCNC: 23 MG/DL (ref 6–20)
BUN/CREAT SERPL: 18.7 (ref 7–25)
CALCIUM SPEC-SCNC: 9.4 MG/DL (ref 8.6–10.5)
CHLORIDE SERPL-SCNC: 104 MMOL/L (ref 98–107)
CO2 SERPL-SCNC: 23 MMOL/L (ref 22–29)
CREAT SERPL-MCNC: 1.23 MG/DL (ref 0.57–1)
DEPRECATED RDW RBC AUTO: 58.9 FL (ref 37–54)
EGFRCR SERPLBLD CKD-EPI 2021: 52.3 ML/MIN/1.73
ERYTHROCYTE [DISTWIDTH] IN BLOOD BY AUTOMATED COUNT: 15.7 % (ref 12.3–15.4)
GLUCOSE SERPL-MCNC: 92 MG/DL (ref 65–99)
HCT VFR BLD AUTO: 36.8 % (ref 34–46.6)
HGB BLD-MCNC: 11.3 G/DL (ref 12–15.9)
MCH RBC QN AUTO: 30.8 PG (ref 26.6–33)
MCHC RBC AUTO-ENTMCNC: 30.7 G/DL (ref 31.5–35.7)
MCV RBC AUTO: 100.3 FL (ref 79–97)
PHOSPHATE SERPL-MCNC: 3.9 MG/DL (ref 2.5–4.5)
PLATELET # BLD AUTO: 191 10*3/MM3 (ref 140–450)
PMV BLD AUTO: 10.1 FL (ref 6–12)
POTASSIUM SERPL-SCNC: 4.5 MMOL/L (ref 3.5–5.2)
PTH-INTACT SERPL-MCNC: 55 PG/ML (ref 15–65)
RBC # BLD AUTO: 3.67 10*6/MM3 (ref 3.77–5.28)
SODIUM SERPL-SCNC: 138 MMOL/L (ref 136–145)
WBC NRBC COR # BLD AUTO: 5.39 10*3/MM3 (ref 3.4–10.8)

## 2024-07-29 PROCEDURE — 82306 VITAMIN D 25 HYDROXY: CPT

## 2024-07-29 PROCEDURE — 85027 COMPLETE CBC AUTOMATED: CPT

## 2024-07-29 PROCEDURE — 36415 COLL VENOUS BLD VENIPUNCTURE: CPT

## 2024-07-29 PROCEDURE — 80048 BASIC METABOLIC PNL TOTAL CA: CPT

## 2024-07-29 PROCEDURE — 83970 ASSAY OF PARATHORMONE: CPT

## 2024-07-29 PROCEDURE — 84100 ASSAY OF PHOSPHORUS: CPT

## 2024-07-30 ENCOUNTER — LAB (OUTPATIENT)
Dept: LAB | Facility: HOSPITAL | Age: 54
End: 2024-07-30
Payer: MEDICARE

## 2024-07-30 DIAGNOSIS — E55.9 VITAMIN D DEFICIENCY: ICD-10-CM

## 2024-07-30 DIAGNOSIS — N18.2 CHRONIC KIDNEY DISEASE, STAGE II (MILD): ICD-10-CM

## 2024-07-30 DIAGNOSIS — R60.9 EDEMA, UNSPECIFIED TYPE: ICD-10-CM

## 2024-07-30 LAB
BACTERIA UR QL AUTO: ABNORMAL /HPF
BILIRUB UR QL STRIP: NEGATIVE
CLARITY UR: CLEAR
COLOR UR: YELLOW
CREAT UR-MCNC: 66.3 MG/DL
GLUCOSE UR STRIP-MCNC: ABNORMAL MG/DL
HGB UR QL STRIP.AUTO: NEGATIVE
HYALINE CASTS UR QL AUTO: ABNORMAL /LPF
KETONES UR QL STRIP: NEGATIVE
LEUKOCYTE ESTERASE UR QL STRIP.AUTO: ABNORMAL
NITRITE UR QL STRIP: NEGATIVE
PH UR STRIP.AUTO: 7 [PH] (ref 5–8)
PROT ?TM UR-MCNC: 5.9 MG/DL
PROT UR QL STRIP: NEGATIVE
PROT/CREAT UR: 0.09 MG/G{CREAT}
RBC # UR STRIP: ABNORMAL /HPF
REF LAB TEST METHOD: ABNORMAL
SP GR UR STRIP: 1.01 (ref 1–1.03)
SQUAMOUS #/AREA URNS HPF: ABNORMAL /HPF
UROBILINOGEN UR QL STRIP: ABNORMAL
WBC # UR STRIP: ABNORMAL /HPF

## 2024-07-30 PROCEDURE — 84156 ASSAY OF PROTEIN URINE: CPT

## 2024-07-30 PROCEDURE — 82570 ASSAY OF URINE CREATININE: CPT

## 2024-07-30 PROCEDURE — 81001 URINALYSIS AUTO W/SCOPE: CPT

## 2024-08-05 DIAGNOSIS — F41.8 DEPRESSION WITH ANXIETY: ICD-10-CM

## 2024-08-05 RX ORDER — SERTRALINE HYDROCHLORIDE 100 MG/1
100 TABLET, FILM COATED ORAL DAILY
Qty: 30 TABLET | Refills: 0 | Status: SHIPPED | OUTPATIENT
Start: 2024-08-05

## 2024-08-05 NOTE — TELEPHONE ENCOUNTER
Rx Refill Note  Requested Prescriptions     Pending Prescriptions Disp Refills    sertraline (ZOLOFT) 100 MG tablet 30 tablet 0     Sig: Take 1 tablet by mouth Daily.      Last office visit with prescribing clinician: 3/4/2024   Last telemedicine visit with prescribing clinician: Visit date not found   Next office visit with prescribing clinician: 9/11/2024  Last refill sent: 07/06/24, #30  Dr Ribeiro is out of the office until 08/21/24, sent to on call Dr Major.     Destiny Franklin LPN  08/05/24, 15:27 EDT

## 2024-09-02 DIAGNOSIS — F41.8 DEPRESSION WITH ANXIETY: ICD-10-CM

## 2024-09-03 RX ORDER — SERTRALINE HYDROCHLORIDE 100 MG/1
100 TABLET, FILM COATED ORAL DAILY
Qty: 90 TABLET | Refills: 3 | Status: SHIPPED | OUTPATIENT
Start: 2024-09-03

## 2024-09-11 ENCOUNTER — OFFICE VISIT (OUTPATIENT)
Dept: FAMILY MEDICINE CLINIC | Age: 54
End: 2024-09-11
Payer: MEDICARE

## 2024-09-11 VITALS
WEIGHT: 251 LBS | HEART RATE: 67 BPM | HEIGHT: 63 IN | TEMPERATURE: 98.2 F | OXYGEN SATURATION: 95 % | BODY MASS INDEX: 44.47 KG/M2

## 2024-09-11 DIAGNOSIS — I10 ESSENTIAL HYPERTENSION: ICD-10-CM

## 2024-09-11 DIAGNOSIS — E78.00 HIGH CHOLESTEROL: ICD-10-CM

## 2024-09-11 DIAGNOSIS — Z00.00 MEDICARE ANNUAL WELLNESS VISIT, SUBSEQUENT: Primary | ICD-10-CM

## 2024-09-11 DIAGNOSIS — N18.31 STAGE 3A CHRONIC KIDNEY DISEASE: ICD-10-CM

## 2024-09-11 PROBLEM — Z28.21 VACCINATION REFUSED BY PATIENT: Status: ACTIVE | Noted: 2024-09-11

## 2024-09-11 PROBLEM — M79.644 THUMB PAIN, RIGHT: Status: RESOLVED | Noted: 2024-05-09 | Resolved: 2024-09-11

## 2024-09-11 PROCEDURE — 1159F MED LIST DOCD IN RCRD: CPT | Performed by: FAMILY MEDICINE

## 2024-09-11 PROCEDURE — 1160F RVW MEDS BY RX/DR IN RCRD: CPT | Performed by: FAMILY MEDICINE

## 2024-09-11 PROCEDURE — 1126F AMNT PAIN NOTED NONE PRSNT: CPT | Performed by: FAMILY MEDICINE

## 2024-09-11 PROCEDURE — 1170F FXNL STATUS ASSESSED: CPT | Performed by: FAMILY MEDICINE

## 2024-09-11 PROCEDURE — 99214 OFFICE O/P EST MOD 30 MIN: CPT | Performed by: FAMILY MEDICINE

## 2024-09-11 PROCEDURE — G0439 PPPS, SUBSEQ VISIT: HCPCS | Performed by: FAMILY MEDICINE

## 2024-09-11 NOTE — ASSESSMENT & PLAN NOTE
ADVICE GIVEN RE:  SEATBELT USE, ALCOHOL USE, HEALTHY DIET, ROUTINE EYE AND DENTAL EXAM, ROUTINE VACCINATIONS.    DECLINES A ZOSTER VACCINATION  MAMMOGRAM, PAP AND COLONOSCOPY DECLINED FOR NOW

## 2024-09-11 NOTE — PROGRESS NOTES
Subjective   The ABCs of the Annual Wellness Visit  Medicare Wellness Visit      Ashely Moore is a 53 y.o. patient who presents for a Medicare Wellness Visit.    The following portions of the patient's history were reviewed and   updated as appropriate: allergies, current medications, past family history, past medical history, past social history, past surgical history, and problem list.    Compared to one year ago, the patient's physical   health is the same.  Compared to one year ago, the patient's mental   health is the same.    Recent Hospitalizations:  She was not admitted to the hospital during the last year.     Current Medical Providers:  Patient Care Team:  Sergio Ribeiro MD as PCP - General (Family Medicine)  Parth Bullock MD as Consulting Physician (Cardiology)  Jacob Benavides MD as Consulting Physician (Pulmonary Disease)  Az Harmon MD as Consulting Physician (Cardiac Electrophysiology)    Outpatient Medications Prior to Visit   Medication Sig Dispense Refill    amiodarone (PACERONE) 200 MG tablet Take 0.5 tablets by mouth Daily.      aspirin 325 MG tablet Take 1 tablet by mouth Daily.      atorvastatin (LIPITOR) 40 MG tablet Take 1 tablet by mouth Daily.      bumetanide (BUMEX) 2 MG tablet Take 0.5 tablets by mouth Daily.      Cholecalciferol (Vitamin D3) 50 MCG (2000 UT) capsule Take 1 capsule by mouth Daily.      Eliquis 2.5 MG tablet tablet Take 1 tablet by mouth Every 12 (Twelve) Hours.      Entresto  MG tablet 1 Tab, Oral, Tab, BID, # 180 Tab, 5 Refill(s), Pharmacy: [facility] [id number], cm, 06/21/23 10:26:00 EDT, CLINICALHEIGHT, 106.48, kg, 06/21/23 10:26:00 EDT, CLINICALWEIGHT, 167.64      Farxiga 10 MG tablet Daily.      loratadine (CLARITIN) 10 MG tablet Take 1 tablet by mouth Daily.      MAGnesium-Oxide 400 (240 Mg) MG tablet       melatonin 5 MG tablet tablet Take 1 tablet by mouth.      potassium chloride (KLOR-CON) 20 MEQ packet Take 10 mEq by  "mouth Daily.      sertraline (ZOLOFT) 100 MG tablet TAKE 1 TABLET BY MOUTH DAILY 90 tablet 3    spironolactone (ALDACTONE) 25 MG tablet Take 1 tablet by mouth Every Night.      Semaglutide (OZEMPIC, 1 MG/DOSE, SC) Inject  under the skin into the appropriate area as directed 1 (One) Time Per Week.       No facility-administered medications prior to visit.     No opioid medication identified on active medication list. I have reviewed chart for other potential  high risk medication/s and harmful drug interactions in the elderly.      Aspirin is on active medication list. Aspirin use is indicated based on review of current medical condition/s. Pros and cons of this therapy have been discussed today. Benefits of this medication outweigh potential harm.  Patient has been encouraged to continue taking this medication.  .      Patient Active Problem List   Diagnosis    Dysthymic disorder    Essential hypertension    Automatic implantable cardiac defibrillator in situ    Nonischemic congestive cardiomyopathy    Obstructive sleep apnea (BIPAP)     Colonoscopy declined for now    Mammogram declined for now    High cholesterol    LVAD (left ventricular assist device) present    Medicare annual wellness visit, subsequent    Vitamin D deficiency    Stage 3a chronic kidney disease (Nephrology)    Zoster vaccination declined by patient     Advance Care Planning Advance Directive is not on file.  ACP discussion was held with the patient during this visit. Patient has an advance directive (not in EMR), copy requested.            Objective   Vitals:    09/11/24 1419   BP: Comment: cannot determine due to LVAD   Pulse: 67   Temp: 98.2 °F (36.8 °C)   TempSrc: Oral   SpO2: 95%  Comment: on room air   Weight: 114 kg (251 lb)   Height: 160 cm (63\")   PainSc: 0-No pain       Estimated body mass index is 44.46 kg/m² as calculated from the following:    Height as of this encounter: 160 cm (63\").    Weight as of this encounter: 114 kg (251 " lb).            Does the patient have evidence of cognitive impairment? No  Lab Results   Component Value Date    CHLPL 181 2024                                                                                                Health  Risk Assessment    Smoking Status:  Social History     Tobacco Use   Smoking Status Former    Current packs/day: 0.00    Average packs/day: 1 pack/day for 20.0 years (20.0 ttl pk-yrs)    Types: Cigarettes    Start date: 1994    Quit date: 2014    Years since quittin.8    Passive exposure: Past   Smokeless Tobacco Never     Alcohol Consumption:  Social History     Substance and Sexual Activity   Alcohol Use Not Currently       Fall Risk Screen  STEADI Fall Risk Assessment was completed, and patient is at LOW risk for falls.Assessment completed on:2024    Depression Screenin/11/2024     2:25 PM   PHQ-2/PHQ-9 Depression Screening   Little Interest or Pleasure in Doing Things 0-->not at all   Feeling Down, Depressed or Hopeless 0-->not at all   PHQ-9: Brief Depression Severity Measure Score 0     Health Habits and Functional and Cognitive Screenin/11/2024     2:25 PM   Functional & Cognitive Status   Do you have difficulty preparing food and eating? No   Do you have difficulty bathing yourself, getting dressed or grooming yourself? No   Do you have difficulty using the toilet? No   Do you have difficulty moving around from place to place? No   Do you have trouble with steps or getting out of a bed or a chair? No   Current Diet Well Balanced Diet   Dental Exam Up to date   Eye Exam Up to date   Exercise (times per week) 3 times per week   Current Exercises Include House Cleaning;Walking   Do you need help using the phone?  No   Are you deaf or do you have serious difficulty hearing?  No   Do you need help to go to places out of walking distance? Yes   Do you need help shopping? Yes   Do you need help preparing meals?  No   Do you need help with  housework?  Yes   Do you need help with laundry? Yes   Do you need help taking your medications? No   Do you need help managing money? No   Do you ever drive or ride in a car without wearing a seat belt? No   Have you felt unusual stress, anger or loneliness in the last month? No   Who do you live with? Spouse   If you need help, do you have trouble finding someone available to you? No   Have you been bothered in the last four weeks by sexual problems? No   Do you have difficulty concentrating, remembering or making decisions? No           Age-appropriate Screening Schedule:  Refer to the list below for future screening recommendations based on patient's age, sex and/or medical conditions. Orders for these recommended tests are listed in the plan section. The patient has been provided with a written plan.    Health Maintenance List  Health Maintenance   Topic Date Due    MAMMOGRAM  Never done    COLORECTAL CANCER SCREENING  Never done    Pneumococcal Vaccine 0-64 (1 of 2 - PCV) Never done    Hepatitis B (1 of 3 - 19+ 3-dose series) Never done    LUNG CANCER SCREENING  Never done    HEPATITIS C SCREENING  Never done    PAP SMEAR  Never done    BMI FOLLOWUP  08/28/2024    COVID-19 Vaccine (5 - 2023-24 season) 09/01/2024    INFLUENZA VACCINE  08/01/2024    TDAP/TD VACCINES (1 - Tdap) 09/11/2025 (Originally 12/18/1988)    ZOSTER VACCINE (1 of 2) 09/11/2025 (Originally 12/18/2019)    HEMOGLOBIN A1C  01/03/2025    LIPID PANEL  07/03/2025    URINE MICROALBUMIN  07/30/2025    ANNUAL WELLNESS VISIT  09/11/2025    DIABETIC FOOT EXAM  Discontinued    DIABETIC EYE EXAM  Discontinued                                                                                                                                                CMS Preventative Services Quick Reference  Risk Factors Identified During Encounter  None Identified    The above risks/problems have been discussed with the patient.  Pertinent information has been shared  "with the patient in the After Visit Summary.  An After Visit Summary and PPPS were made available to the patient.    Follow Up:   Next Medicare Wellness visit to be scheduled in 1 year.         Additional E&M Note during same encounter follows:  Patient has additional, significant, and separately identifiable condition(s)/problem(s) that require work above and beyond the Medicare Wellness Visit     Chief Complaint  Medicare Wellness-subsequent    Subjective   --TOLERATING BLOOD PRESSURE MEDICATION WITHOUT APPARENT SIDE EFFECTS  --LAST LIPIDS WERE OK, NO ISSUES WITH THE STATIN  --GFR IS NOW BACK UP TO 52, FOLLOWED BY NEPHROLOGY    ?  Ashely is also being seen today for additional medical problem/s.    Review of Systems   Constitutional:  Positive for fatigue. Negative for activity change, appetite change, chills and fever.   HENT:  Negative for congestion, ear pain, hearing loss, rhinorrhea and sore throat.    Eyes:  Negative for discharge and visual disturbance.   Respiratory:  Negative for cough and shortness of breath.    Cardiovascular:  Negative for chest pain, palpitations and leg swelling.   Gastrointestinal:  Negative for abdominal pain, diarrhea, nausea and vomiting.   Genitourinary:  Negative for dysuria and hematuria.   Musculoskeletal:  Negative for arthralgias and myalgias.   Psychiatric/Behavioral:  Negative for dysphoric mood.               Objective   Vital Signs:  Pulse 67   Temp 98.2 °F (36.8 °C) (Oral)   Ht 160 cm (63\")   Wt 114 kg (251 lb)   SpO2 95% Comment: on room air  BMI 44.46 kg/m²   Physical Exam  Vitals and nursing note reviewed.   Constitutional:       General: She is not in acute distress.     Appearance: Normal appearance.   HENT:      Right Ear: Tympanic membrane normal.      Left Ear: Tympanic membrane normal.      Mouth/Throat:      Pharynx: Oropharynx is clear.   Eyes:      Conjunctiva/sclera: Conjunctivae normal.   Cardiovascular:      Rate and Rhythm: Normal rate and regular " rhythm.      Heart sounds: Normal heart sounds. No murmur heard.  Pulmonary:      Effort: Pulmonary effort is normal.      Breath sounds: Normal breath sounds.   Abdominal:      General: Bowel sounds are normal.      Palpations: Abdomen is soft.      Tenderness: There is no abdominal tenderness.   Musculoskeletal:      Cervical back: Neck supple.      Right lower leg: No edema.      Left lower leg: No edema.   Lymphadenopathy:      Cervical: No cervical adenopathy.   Neurological:      General: No focal deficit present.      Mental Status: She is alert.      Cranial Nerves: No cranial nerve deficit.      Coordination: Coordination normal.      Gait: Gait normal.   Psychiatric:         Mood and Affect: Mood normal.         Behavior: Behavior normal.                 Assessment and Plan               Essential hypertension  Hypertension is stable and controlled  Continue current treatment regimen.  Blood pressure will be reassessed in 6 months.  High cholesterol   Lipid abnormalities are stable    Plan:  Continue same medication/s without change.      Discussed medication dosage, use, side effects, and goals of treatment in detail.    Counseled patient on lifestyle modifications to help control hyperlipidemia.     Patient Treatment Goals:   LDL goal is less than 70    Followup in 6 months.  Medicare annual wellness visit, subsequent  ADVICE GIVEN RE:  SEATBELT USE, ALCOHOL USE, HEALTHY DIET, ROUTINE EYE AND DENTAL EXAM, ROUTINE VACCINATIONS.    DECLINES A ZOSTER VACCINATION  MAMMOGRAM, PAP AND COLONOSCOPY DECLINED FOR NOW   Stage 3a chronic kidney disease (Nephrology)  Renal condition is improving with treatment.  Continue current treatment regimen.  Renal condition will be reassessed in 6 months.  No orders of the defined types were placed in this encounter.            Follow Up   Return in about 6 months (around 3/11/2025).  Patient was given instructions and counseling regarding her condition or for health  maintenance advice. Please see specific information pulled into the AVS if appropriate.

## 2024-12-11 LAB — HBA1C MFR BLD: 5.5 % (ref 4.8–5.6)

## 2025-01-13 ENCOUNTER — LAB (OUTPATIENT)
Dept: LAB | Facility: HOSPITAL | Age: 55
End: 2025-01-13
Payer: COMMERCIAL

## 2025-01-13 ENCOUNTER — TRANSCRIBE ORDERS (OUTPATIENT)
Dept: ADMINISTRATIVE | Facility: HOSPITAL | Age: 55
End: 2025-01-13
Payer: COMMERCIAL

## 2025-01-13 DIAGNOSIS — N18.2 CHRONIC KIDNEY DISEASE, STAGE II (MILD): Primary | ICD-10-CM

## 2025-01-13 DIAGNOSIS — N18.2 CHRONIC KIDNEY DISEASE, STAGE II (MILD): ICD-10-CM

## 2025-01-13 LAB
ALBUMIN SERPL-MCNC: 4.2 G/DL (ref 3.5–5.2)
ALBUMIN/GLOB SERPL: 1.4 G/DL
ALP SERPL-CCNC: 133 U/L (ref 39–117)
ALT SERPL W P-5'-P-CCNC: 32 U/L (ref 1–33)
ANION GAP SERPL CALCULATED.3IONS-SCNC: 15.8 MMOL/L (ref 5–15)
AST SERPL-CCNC: 27 U/L (ref 1–32)
BILIRUB SERPL-MCNC: 0.4 MG/DL (ref 0–1.2)
BUN SERPL-MCNC: 29 MG/DL (ref 6–20)
BUN/CREAT SERPL: 20.3 (ref 7–25)
CALCIUM SPEC-SCNC: 9.8 MG/DL (ref 8.6–10.5)
CHLORIDE SERPL-SCNC: 99 MMOL/L (ref 98–107)
CO2 SERPL-SCNC: 23.2 MMOL/L (ref 22–29)
CREAT SERPL-MCNC: 1.43 MG/DL (ref 0.57–1)
DEPRECATED RDW RBC AUTO: 50.7 FL (ref 37–54)
EGFRCR SERPLBLD CKD-EPI 2021: 43.4 ML/MIN/1.73
ERYTHROCYTE [DISTWIDTH] IN BLOOD BY AUTOMATED COUNT: 14.5 % (ref 12.3–15.4)
GLOBULIN UR ELPH-MCNC: 3.1 GM/DL
GLUCOSE SERPL-MCNC: 95 MG/DL (ref 65–99)
HCT VFR BLD AUTO: 43 % (ref 34–46.6)
HGB BLD-MCNC: 13.9 G/DL (ref 12–15.9)
MCH RBC QN AUTO: 30.7 PG (ref 26.6–33)
MCHC RBC AUTO-ENTMCNC: 32.3 G/DL (ref 31.5–35.7)
MCV RBC AUTO: 94.9 FL (ref 79–97)
PLATELET # BLD AUTO: 170 10*3/MM3 (ref 140–450)
PMV BLD AUTO: 12.3 FL (ref 6–12)
POTASSIUM SERPL-SCNC: 4.2 MMOL/L (ref 3.5–5.2)
PROT SERPL-MCNC: 7.3 G/DL (ref 6–8.5)
RBC # BLD AUTO: 4.53 10*6/MM3 (ref 3.77–5.28)
SODIUM SERPL-SCNC: 138 MMOL/L (ref 136–145)
WBC NRBC COR # BLD AUTO: 7.02 10*3/MM3 (ref 3.4–10.8)

## 2025-01-13 PROCEDURE — 81001 URINALYSIS AUTO W/SCOPE: CPT

## 2025-01-13 PROCEDURE — 36415 COLL VENOUS BLD VENIPUNCTURE: CPT

## 2025-01-13 PROCEDURE — 80053 COMPREHEN METABOLIC PANEL: CPT

## 2025-01-13 PROCEDURE — 85027 COMPLETE CBC AUTOMATED: CPT

## 2025-01-13 PROCEDURE — 82570 ASSAY OF URINE CREATININE: CPT

## 2025-01-13 PROCEDURE — 84156 ASSAY OF PROTEIN URINE: CPT

## 2025-01-14 ENCOUNTER — LAB (OUTPATIENT)
Dept: LAB | Facility: HOSPITAL | Age: 55
End: 2025-01-14
Payer: COMMERCIAL

## 2025-01-14 DIAGNOSIS — N18.2 CHRONIC KIDNEY DISEASE, STAGE II (MILD): ICD-10-CM

## 2025-01-14 LAB
BACTERIA UR QL AUTO: ABNORMAL /HPF
BILIRUB UR QL STRIP: NEGATIVE
CLARITY UR: ABNORMAL
COLOR UR: YELLOW
CREAT UR-MCNC: 117.9 MG/DL
GLUCOSE UR STRIP-MCNC: ABNORMAL MG/DL
HGB UR QL STRIP.AUTO: ABNORMAL
KETONES UR QL STRIP: NEGATIVE
LEUKOCYTE ESTERASE UR QL STRIP.AUTO: ABNORMAL
NITRITE UR QL STRIP: NEGATIVE
PH UR STRIP.AUTO: 5.5 [PH] (ref 5–8)
PROT ?TM UR-MCNC: 9.5 MG/DL
PROT UR QL STRIP: NEGATIVE
PROT/CREAT UR: 0.08 MG/G{CREAT}
RBC # UR STRIP: ABNORMAL /HPF
REF LAB TEST METHOD: ABNORMAL
SP GR UR STRIP: 1.02 (ref 1–1.03)
SQUAMOUS #/AREA URNS HPF: ABNORMAL /HPF
UROBILINOGEN UR QL STRIP: ABNORMAL
WBC # UR STRIP: ABNORMAL /HPF

## 2025-01-14 PROCEDURE — 81001 URINALYSIS AUTO W/SCOPE: CPT

## 2025-01-14 PROCEDURE — 84156 ASSAY OF PROTEIN URINE: CPT

## 2025-01-14 PROCEDURE — 82570 ASSAY OF URINE CREATININE: CPT

## 2025-03-14 ENCOUNTER — OFFICE VISIT (OUTPATIENT)
Dept: FAMILY MEDICINE CLINIC | Age: 55
End: 2025-03-14
Payer: COMMERCIAL

## 2025-03-14 VITALS
HEIGHT: 63 IN | WEIGHT: 244.2 LBS | TEMPERATURE: 98.2 F | OXYGEN SATURATION: 97 % | BODY MASS INDEX: 43.27 KG/M2 | HEART RATE: 59 BPM

## 2025-03-14 DIAGNOSIS — E66.01 OBESITY, CLASS III, BMI 40-49.9 (MORBID OBESITY): ICD-10-CM

## 2025-03-14 DIAGNOSIS — Z12.31 ENCOUNTER FOR SCREENING MAMMOGRAM FOR MALIGNANT NEOPLASM OF BREAST: ICD-10-CM

## 2025-03-14 DIAGNOSIS — I10 ESSENTIAL HYPERTENSION: Primary | ICD-10-CM

## 2025-03-14 DIAGNOSIS — E78.00 HIGH CHOLESTEROL: ICD-10-CM

## 2025-03-14 DIAGNOSIS — N18.31 STAGE 3A CHRONIC KIDNEY DISEASE: ICD-10-CM

## 2025-03-14 PROBLEM — E66.813 OBESITY, CLASS III, BMI 40-49.9 (MORBID OBESITY): Status: ACTIVE | Noted: 2025-03-14

## 2025-03-14 PROBLEM — Z00.00 MEDICARE ANNUAL WELLNESS VISIT, SUBSEQUENT: Status: RESOLVED | Noted: 2023-08-28 | Resolved: 2025-03-14

## 2025-03-14 NOTE — PROGRESS NOTES
Chief Complaint  Hypertension (6 months)    Subjective          Ashely Moore presents to [facility]  History of Present Illness  --TOLERATING BLOOD PRESSURE MEDICATION WITHOUT APPARENT SIDE EFFECTS, ALSO FOLLOWED BY CARDIOLOGY  --LAST LIPIDS WER OK NO ISSUES WITH MEDS  --GFR WAS STABLE AT 51, FOLLOWED BY NEPHROLOGY  --HER BMI IS > 40 AND SHE IS BEING CONSIDERED FOR A GASTRIC SLEEVE        No Known Allergies     Health Maintenance Due   Topic Date Due    Pneumococcal Vaccine 50+ (1 of 2 - PCV) Never done    MAMMOGRAM  Never done    HEPATITIS C SCREENING  Never done    INFLUENZA VACCINE  Never done    BMI FOLLOWUP  08/28/2024    COVID-19 Vaccine (5 - 2024-25 season) 09/01/2024        Current Outpatient Medications on File Prior to Visit   Medication Sig    aspirin 325 MG tablet Take 1 tablet by mouth Daily.    atorvastatin (LIPITOR) 40 MG tablet Take 1 tablet by mouth Daily.    bumetanide (BUMEX) 2 MG tablet Take 0.5 tablets by mouth Daily.    Cholecalciferol (Vitamin D3) 50 MCG (2000 UT) capsule Take 1 capsule by mouth Daily.    Eliquis 2.5 MG tablet tablet Take 1 tablet by mouth Every 12 (Twelve) Hours.    Entresto  MG tablet 1 Tab, Oral, Tab, BID, # 180 Tab, 5 Refill(s), Pharmacy: [facility] [id number], cm, 06/21/23 10:26:00 EDT, CLINICALHEIGHT, 106.48, kg, 06/21/23 10:26:00 EDT, CLINICALWEIGHT, 167.64    Farxiga 10 MG tablet Daily.    MAGnesium-Oxide 400 (240 Mg) MG tablet     potassium chloride (KLOR-CON) 20 MEQ packet Take 10 mEq by mouth Daily. (Patient taking differently: Take 10 mEq by mouth 2 (Two) Times a Day.)    sertraline (ZOLOFT) 100 MG tablet TAKE 1 TABLET BY MOUTH DAILY    spironolactone (ALDACTONE) 25 MG tablet Take 1 tablet by mouth Every Night.    [DISCONTINUED] loratadine (CLARITIN) 10 MG tablet Take 1 tablet by mouth Daily.    [DISCONTINUED] amiodarone (PACERONE) 200 MG tablet Take 0.5 tablets by mouth Daily.    [DISCONTINUED] melatonin 5 MG tablet  "tablet Take 1 tablet by mouth.     No current facility-administered medications on file prior to visit.       Immunization History   Administered Date(s) Administered    COVID-19 (MODERNA) 1st,2nd,3rd Dose Monovalent 01/04/2022    COVID-19 (PFIZER) BIVALENT 12+YRS 01/24/2023    COVID-19 (PFIZER) Purple Cap Monovalent 03/17/2021, 05/17/2021       Review of Systems   Constitutional:  Positive for fatigue. Negative for activity change, appetite change, chills and fever.   HENT:  Negative for congestion, ear pain, rhinorrhea and sore throat.    Respiratory:  Negative for cough and shortness of breath.    Cardiovascular:  Negative for chest pain, palpitations and leg swelling.   Gastrointestinal:  Negative for abdominal pain, constipation, diarrhea, nausea and vomiting.   Musculoskeletal:  Negative for arthralgias and myalgias.   Neurological:  Negative for headache.        Objective     Pulse 59   Temp 98.2 °F (36.8 °C) (Oral)   Ht 160 cm (63\")   Wt 111 kg (244 lb 3.2 oz)   SpO2 97% Comment: on room air  BMI 43.26 kg/m²       Physical Exam  Vitals and nursing note reviewed.   Constitutional:       General: She is not in acute distress.     Appearance: Normal appearance.   Cardiovascular:      Heart sounds: No murmur heard.     Comments: HEART SOUNDS ARE MUFFLED BY THE LVAD ARTIFACT   Pulmonary:      Effort: Pulmonary effort is normal.      Breath sounds: Normal breath sounds.   Abdominal:      Palpations: Abdomen is soft.      Tenderness: There is no abdominal tenderness.   Musculoskeletal:      Cervical back: Neck supple.      Right lower leg: No edema.      Left lower leg: No edema.   Lymphadenopathy:      Cervical: No cervical adenopathy.   Neurological:      General: No focal deficit present.      Mental Status: She is alert.      Cranial Nerves: No cranial nerve deficit.      Coordination: Coordination normal.      Gait: Gait normal.   Psychiatric:         Mood and Affect: Mood normal.         Behavior: " Behavior normal.         Result Review :                             Assessment and Plan      Diagnoses and all orders for this visit:    1. Essential hypertension (Primary)  Assessment & Plan:  Hypertension is stable and controlled  Continue current treatment regimen.  Blood pressure will be reassessed in 6 months  PLANS PER CARDIOLOGY .      2. High cholesterol  Assessment & Plan:   Lipid abnormalities are stable    Plan:  Continue same medication/s without change.      Discussed medication dosage, use, side effects, and goals of treatment in detail.    Counseled patient on lifestyle modifications to help control hyperlipidemia.     Patient Treatment Goals:   LDL goal is less than 70    Followup in 6 months.      3. Stage 3a chronic kidney disease (Nephrology)  Assessment & Plan:  Renal condition is stable.  Continue current treatment regimen.  Renal condition will be reassessed in 6 months  PLANS PER NEPHROLOGY .      4. Obesity, Class III, BMI 40-49.9 (morbid obesity)  Assessment & Plan:  PLANS PER BARIATRIC SURGERY       5. Encounter for screening mammogram for malignant neoplasm of breast  -     Mammo Screening Bilateral With CAD; Future        Class 3 Severe Obesity (BMI >=40). Obesity-related health conditions include the following: hypertension. Obesity is unchanged. BMI is is above average; BMI management plan is completed. We discussed portion control and increasing exercise.           Follow Up     Return in about 6 months (around 9/14/2025).    Patient was given instructions and counseling regarding her condition or for health maintenance advice. Please see specific information pulled into the AVS if appropriate.

## 2025-03-14 NOTE — ASSESSMENT & PLAN NOTE
Hypertension is stable and controlled  Continue current treatment regimen.  Blood pressure will be reassessed in 6 months  PLANS PER CARDIOLOGY .

## 2025-03-14 NOTE — ASSESSMENT & PLAN NOTE
Renal condition is stable.  Continue current treatment regimen.  Renal condition will be reassessed in 6 months  PLANS PER NEPHROLOGY .

## 2025-07-14 ENCOUNTER — LAB (OUTPATIENT)
Dept: LAB | Facility: HOSPITAL | Age: 55
End: 2025-07-14
Payer: COMMERCIAL

## 2025-07-14 ENCOUNTER — TRANSCRIBE ORDERS (OUTPATIENT)
Dept: ADMINISTRATIVE | Facility: HOSPITAL | Age: 55
End: 2025-07-14
Payer: COMMERCIAL

## 2025-07-14 DIAGNOSIS — N18.2 CHRONIC RENAL DISEASE, STAGE II: Primary | ICD-10-CM

## 2025-07-14 DIAGNOSIS — N18.2 CHRONIC RENAL DISEASE, STAGE II: ICD-10-CM

## 2025-07-14 LAB
ALBUMIN SERPL-MCNC: 4.2 G/DL (ref 3.5–5.2)
ALBUMIN/GLOB SERPL: 1.2 G/DL
ALP SERPL-CCNC: 157 U/L (ref 39–117)
ALT SERPL W P-5'-P-CCNC: 31 U/L (ref 1–33)
ANION GAP SERPL CALCULATED.3IONS-SCNC: 12.8 MMOL/L (ref 5–15)
AST SERPL-CCNC: 22 U/L (ref 1–32)
BASOPHILS # BLD AUTO: 0.01 10*3/MM3 (ref 0–0.2)
BASOPHILS NFR BLD AUTO: 0.2 % (ref 0–1.5)
BILIRUB SERPL-MCNC: 0.5 MG/DL (ref 0–1.2)
BUN SERPL-MCNC: 27 MG/DL (ref 6–20)
BUN/CREAT SERPL: 19.4 (ref 7–25)
CALCIUM SPEC-SCNC: 9.6 MG/DL (ref 8.6–10.5)
CHLORIDE SERPL-SCNC: 102 MMOL/L (ref 98–107)
CO2 SERPL-SCNC: 26.2 MMOL/L (ref 22–29)
CREAT SERPL-MCNC: 1.39 MG/DL (ref 0.57–1)
DEPRECATED RDW RBC AUTO: 53.7 FL (ref 37–54)
EGFRCR SERPLBLD CKD-EPI 2021: 44.9 ML/MIN/1.73
EOSINOPHIL # BLD AUTO: 0.12 10*3/MM3 (ref 0–0.4)
EOSINOPHIL NFR BLD AUTO: 2 % (ref 0.3–6.2)
ERYTHROCYTE [DISTWIDTH] IN BLOOD BY AUTOMATED COUNT: 15.1 % (ref 12.3–15.4)
GLOBULIN UR ELPH-MCNC: 3.4 GM/DL
GLUCOSE SERPL-MCNC: 105 MG/DL (ref 65–99)
HCT VFR BLD AUTO: 43.3 % (ref 34–46.6)
HGB BLD-MCNC: 14 G/DL (ref 12–15.9)
IMM GRANULOCYTES # BLD AUTO: 0.01 10*3/MM3 (ref 0–0.05)
IMM GRANULOCYTES NFR BLD AUTO: 0.2 % (ref 0–0.5)
LYMPHOCYTES # BLD AUTO: 0.94 10*3/MM3 (ref 0.7–3.1)
LYMPHOCYTES NFR BLD AUTO: 15.7 % (ref 19.6–45.3)
MCH RBC QN AUTO: 30.7 PG (ref 26.6–33)
MCHC RBC AUTO-ENTMCNC: 32.3 G/DL (ref 31.5–35.7)
MCV RBC AUTO: 95 FL (ref 79–97)
MONOCYTES # BLD AUTO: 0.45 10*3/MM3 (ref 0.1–0.9)
MONOCYTES NFR BLD AUTO: 7.5 % (ref 5–12)
NEUTROPHILS NFR BLD AUTO: 4.47 10*3/MM3 (ref 1.7–7)
NEUTROPHILS NFR BLD AUTO: 74.4 % (ref 42.7–76)
PHOSPHATE SERPL-MCNC: 3.2 MG/DL (ref 2.5–4.5)
PLATELET # BLD AUTO: 164 10*3/MM3 (ref 140–450)
PMV BLD AUTO: 10.1 FL (ref 6–12)
POTASSIUM SERPL-SCNC: 4.1 MMOL/L (ref 3.5–5.2)
PROT SERPL-MCNC: 7.6 G/DL (ref 6–8.5)
PTH-INTACT SERPL-MCNC: 231 PG/ML (ref 15–65)
RBC # BLD AUTO: 4.56 10*6/MM3 (ref 3.77–5.28)
SODIUM SERPL-SCNC: 141 MMOL/L (ref 136–145)
WBC NRBC COR # BLD AUTO: 6 10*3/MM3 (ref 3.4–10.8)

## 2025-07-14 PROCEDURE — 84100 ASSAY OF PHOSPHORUS: CPT

## 2025-07-14 PROCEDURE — 36415 COLL VENOUS BLD VENIPUNCTURE: CPT

## 2025-07-14 PROCEDURE — 83970 ASSAY OF PARATHORMONE: CPT

## 2025-07-14 PROCEDURE — 80053 COMPREHEN METABOLIC PANEL: CPT

## 2025-07-14 PROCEDURE — 85025 COMPLETE CBC W/AUTO DIFF WBC: CPT

## 2025-07-15 ENCOUNTER — LAB (OUTPATIENT)
Dept: LAB | Facility: HOSPITAL | Age: 55
End: 2025-07-15
Payer: COMMERCIAL

## 2025-07-15 DIAGNOSIS — N18.2 CHRONIC RENAL DISEASE, STAGE II: ICD-10-CM

## 2025-07-15 LAB
BACTERIA UR QL AUTO: ABNORMAL /HPF
BILIRUB UR QL STRIP: NEGATIVE
CLARITY UR: ABNORMAL
COLOR UR: YELLOW
CREAT UR-MCNC: 126.6 MG/DL
GLUCOSE UR STRIP-MCNC: ABNORMAL MG/DL
HGB UR QL STRIP.AUTO: ABNORMAL
KETONES UR QL STRIP: NEGATIVE
LEUKOCYTE ESTERASE UR QL STRIP.AUTO: NEGATIVE
NITRITE UR QL STRIP: POSITIVE
PH UR STRIP.AUTO: 5.5 [PH] (ref 5–8)
PROT ?TM UR-MCNC: 7.9 MG/DL
PROT UR QL STRIP: NEGATIVE
PROT/CREAT UR: 0.06 MG/G{CREAT}
RBC # UR STRIP: ABNORMAL /HPF
REF LAB TEST METHOD: ABNORMAL
SP GR UR STRIP: 1.02 (ref 1–1.03)
SQUAMOUS #/AREA URNS HPF: ABNORMAL /HPF
UROBILINOGEN UR QL STRIP: ABNORMAL
WBC # UR STRIP: ABNORMAL /HPF

## 2025-07-15 PROCEDURE — 84156 ASSAY OF PROTEIN URINE: CPT

## 2025-07-15 PROCEDURE — 82570 ASSAY OF URINE CREATININE: CPT

## 2025-07-15 PROCEDURE — 81001 URINALYSIS AUTO W/SCOPE: CPT

## (undated) DEVICE — CANN TRPL DAM 7X7MM NO VLV

## (undated) DEVICE — GOWN,NON-REINFORCED,SIRUS,SET IN SLV,XXL: Brand: MEDLINE

## (undated) DEVICE — NDL HYPO PRECISIONGLIDE REG 25G 1 1/2

## (undated) DEVICE — EMERALD ARTHROSCOPY SHEET: Brand: CONVERTORS

## (undated) DEVICE — TUBING SET, GRAVITY, 4-SPIKE

## (undated) DEVICE — BLD SHAVER BONECUTTER 4MM 13CM

## (undated) DEVICE — PK ARTHSCP SHLDR TOWER 40

## (undated) DEVICE — BNDG ADHS PLSTC 1X3IN LF

## (undated) DEVICE — ABL ASP APOLLO RF XL 90D

## (undated) DEVICE — DRSNG WND GZ CURAD OIL EMULSION 3X3IN STRL

## (undated) DEVICE — ARM SLING: Brand: DEROYAL

## (undated) DEVICE — GLV SURG BIOGEL LTX PF 8 1/2

## (undated) DEVICE — SUT ETHLN 3/0 PS1 18IN 1663H

## (undated) DEVICE — POSTN ARMSLV LAT/TRACTION DISP

## (undated) DEVICE — GLV SURG SIGNATURE ESSENTIAL PF LTX SZ8.5

## (undated) DEVICE — SOL ISO/ALC RUB 70PCT 4OZ

## (undated) DEVICE — SYR LL TP 10ML STRL

## (undated) DEVICE — DRAPE,U/ SHT,SPLIT,PLAS,STERIL: Brand: MEDLINE

## (undated) DEVICE — NDL HYPO ECLPS SFTY 18G 1 1/2IN

## (undated) DEVICE — GLV SURG BIOGEL LTX PF 6 1/2

## (undated) DEVICE — SKIN PREP TRAY W/CHG: Brand: MEDLINE INDUSTRIES, INC.

## (undated) DEVICE — APPL CHLORAPREP HI/LITE 26ML ORNG

## (undated) DEVICE — GLV SURG SIGNATURE ESSENTIAL PF LTX SZ6.5